# Patient Record
Sex: FEMALE | Race: BLACK OR AFRICAN AMERICAN | Employment: FULL TIME | URBAN - METROPOLITAN AREA
[De-identification: names, ages, dates, MRNs, and addresses within clinical notes are randomized per-mention and may not be internally consistent; named-entity substitution may affect disease eponyms.]

---

## 2017-01-01 PROBLEM — Z22.330 GBS CARRIER: Status: ACTIVE | Noted: 2017-01-01

## 2017-01-03 ENCOUNTER — ROUTINE PRENATAL (OUTPATIENT)
Dept: OBGYN CLINIC | Age: 37
End: 2017-01-03

## 2017-01-03 VITALS
SYSTOLIC BLOOD PRESSURE: 140 MMHG | BODY MASS INDEX: 35.88 KG/M2 | HEIGHT: 62 IN | DIASTOLIC BLOOD PRESSURE: 89 MMHG | WEIGHT: 195 LBS | RESPIRATION RATE: 18 BRPM | HEART RATE: 98 BPM

## 2017-01-03 DIAGNOSIS — Z3A.34 34 WEEKS GESTATION OF PREGNANCY: ICD-10-CM

## 2017-01-03 DIAGNOSIS — O09.93 HIGH-RISK PREGNANCY, THIRD TRIMESTER: Primary | ICD-10-CM

## 2017-01-03 NOTE — PROGRESS NOTES
Patient was seen on L&D  complaining of contractions. She had no cervical change, but was given 2 doses of betamethasone and is also receiving weekly Alfred due to a history of a previous  delivery. She states that the contractions have slowed and are milder now, but are still coming about once an hour. She also complains of leaking a small amount of fluid, but denies vaginal bleeding. SSE: no pooling, Nitrazine negative, fern negative  Cervical exam unchanged from previous. Patient reassured, PTL precautions given, to return  for scheduled Alfred. Plan of care discussed. Patient expressed understanding.

## 2017-01-03 NOTE — MR AVS SNAPSHOT
Visit Information Date & Time Provider Department Dept. Phone Encounter #  
 1/3/2017  8:00 AM Zacarias Yates, Neva Carlton [phone] [id number] Upcoming Health Maintenance Date Due  
 PAP AKA CERVICAL CYTOLOGY 8/12/2019 Allergies as of 1/3/2017  Review Complete On: 1/3/2017 By: Zacarias Yates DO Severity Noted Reaction Type Reactions Iodine  07/07/2016    Hives, Nausea and Vomiting MRI DYE Current Immunizations  Never Reviewed Name Date Influenza Vaccine 10/6/2016 Not reviewed this visit You Were Diagnosed With   
  
 Codes Comments High-risk pregnancy, third trimester    -  Primary ICD-10-CM: O09.93 
ICD-9-CM: V23.9 34 weeks gestation of pregnancy     ICD-10-CM: Z3A.34 
ICD-9-CM: V22.2 Vitals BP Pulse Resp Height(growth percentile) Weight(growth percentile) LMP  
 140/89 98 18 5' 2\" (1.575 m) 195 lb (88.5 kg) 05/10/2016 BMI OB Status Smoking Status 35.67 kg/m2 Pregnant Former Smoker BMI and BSA Data Body Mass Index Body Surface Area  
 35.67 kg/m 2 1.97 m 2 Preferred Pharmacy Pharmacy Name Phone [address] Your Updated Medication List  
  
   
This list is accurate as of: 1/3/17  9:08 AM.  Always use your most recent med list.  
  
  
  
  
 labetalol 100 mg tablet Commonly known as:  Abril Valdez Take 1 Tab by mouth two (2) times a day. prenatal vit-iron fumarate-fa 28 mg iron- 800 mcg Tab Commonly known as:  PRENATAL PLUS with IRON Take 1 Tab by mouth daily. Indications: PREGNANCY Introducing [facility] & HEALTH SERVICES! Dear Alfonso Oliva: Thank you for requesting a [employer] account. Our records indicate that you already have an active [employer] account. You can access your account anytime at [email or web address] Did you know that you can access your hospital and ER discharge instructions at any time in [employer]? You can also review all of your test results from your hospital stay or ER visit. Additional Information If you have questions, please visit the Frequently Asked Questions section of the [employer] website at [email or web address] Remember, [employer] is NOT to be used for urgent needs. For medical emergencies, dial 911. Now available from your iPhone and Android! Please provide this summary of care documentation to your next provider. Your primary care clinician is listed as NONE. If you have any questions after today's visit, please call [phone].

## 2017-01-05 ENCOUNTER — HOSPITAL ENCOUNTER (EMERGENCY)
Age: 37
Discharge: HOME | End: 2017-01-05
Attending: OBSTETRICS & GYNECOLOGY | Admitting: OBSTETRICS & GYNECOLOGY
Payer: MEDICAID

## 2017-01-05 ENCOUNTER — ROUTINE PRENATAL (OUTPATIENT)
Dept: OBGYN CLINIC | Age: 37
End: 2017-01-05

## 2017-01-05 VITALS — BODY MASS INDEX: 35.88 KG/M2 | HEIGHT: 62 IN | WEIGHT: 195 LBS | RESPIRATION RATE: 18 BRPM | TEMPERATURE: 99 F

## 2017-01-05 DIAGNOSIS — R31.21 ASYMPTOMATIC MICROSCOPIC HEMATURIA: ICD-10-CM

## 2017-01-05 DIAGNOSIS — Z36.89 NON-STRESS TEST REACTIVE: ICD-10-CM

## 2017-01-05 DIAGNOSIS — O09.893 HX OF PRETERM DELIVERY, CURRENTLY PREGNANT, THIRD TRIMESTER: ICD-10-CM

## 2017-01-05 DIAGNOSIS — I10 CHRONIC HYPERTENSION: ICD-10-CM

## 2017-01-05 DIAGNOSIS — E86.0 DEHYDRATION: ICD-10-CM

## 2017-01-05 DIAGNOSIS — O09.93 HIGH-RISK PREGNANCY, THIRD TRIMESTER: ICD-10-CM

## 2017-01-05 DIAGNOSIS — Z3A.34 34 WEEKS GESTATION OF PREGNANCY: ICD-10-CM

## 2017-01-05 DIAGNOSIS — O09.893 HX OF PRETERM DELIVERY, CURRENTLY PREGNANT, THIRD TRIMESTER: Primary | ICD-10-CM

## 2017-01-05 DIAGNOSIS — O34.33 PREMATURE CERVICAL DILATION IN THIRD TRIMESTER: ICD-10-CM

## 2017-01-05 LAB
APPEARANCE UR: CLEAR
BILIRUB UR QL: NEGATIVE
COLOR UR: YELLOW
GLUCOSE UR QL STRIP.AUTO: NEGATIVE MG/DL
KETONES UR-MCNC: NEGATIVE MG/DL
LEUKOCYTE ESTERASE UR QL STRIP: NEGATIVE
NITRITE UR QL: NEGATIVE
PH UR: 7 [PH] (ref 5–8)
PROT UR QL: ABNORMAL MG/DL
RBC # UR STRIP: ABNORMAL /UL
SP GR UR: 1.02 (ref 1–1.03)
UROBILINOGEN UR QL: 0.2 EU/DL (ref 0.2–1)

## 2017-01-05 PROCEDURE — 99285 EMERGENCY DEPT VISIT HI MDM: CPT

## 2017-01-05 PROCEDURE — 81003 URINALYSIS AUTO W/O SCOPE: CPT

## 2017-01-05 PROCEDURE — 59020 FETAL CONTRACT STRESS TEST: CPT

## 2017-01-05 PROCEDURE — 96360 HYDRATION IV INFUSION INIT: CPT

## 2017-01-05 PROCEDURE — 74011250636 HC RX REV CODE- 250/636: Performed by: OBSTETRICS & GYNECOLOGY

## 2017-01-05 PROCEDURE — 77030020255 HC SOL INJ LR 1000ML BG

## 2017-01-05 PROCEDURE — 87086 URINE CULTURE/COLONY COUNT: CPT | Performed by: OBSTETRICS & GYNECOLOGY

## 2017-01-05 PROCEDURE — 96361 HYDRATE IV INFUSION ADD-ON: CPT

## 2017-01-05 RX ORDER — NITROFURANTOIN 25; 75 MG/1; MG/1
100 CAPSULE ORAL 2 TIMES DAILY
Qty: 10 CAP | Refills: 0 | Status: SHIPPED | OUTPATIENT
Start: 2017-01-05 | End: 2017-01-10

## 2017-01-05 RX ORDER — HYDROXYPROGESTERONE CAPROATE 250 MG/ML
250 INJECTION INTRAMUSCULAR ONCE
Qty: 250 MG | Refills: 0 | Status: SHIPPED | COMMUNITY
Start: 2017-01-05 | End: 2017-01-05

## 2017-01-05 RX ADMIN — SODIUM CHLORIDE, SODIUM LACTATE, POTASSIUM CHLORIDE, AND CALCIUM CHLORIDE 1000 ML: 600; 310; 30; 20 INJECTION, SOLUTION INTRAVENOUS at 11:15

## 2017-01-05 NOTE — IP AVS SNAPSHOT
Current Discharge Medication List  
  
Take these medications at their scheduled times Dose & Instructions Dispensing Information Comments Morning Noon Evening Bedtime  
 labetalol 100 mg tablet Commonly known as:  Lorayne Arlee Your next dose is: Today, Tomorrow Other:  ____________ Dose:  100 mg Take 1 Tab by mouth two (2) times a day. Quantity:  60 Tab Refills:  1  
     
   
   
   
  
 nitrofurantoin (macrocrystal-monohydrate) 100 mg capsule Commonly known as:  MACROBID Your next dose is: Today, Tomorrow Other:  ____________ Dose:  100 mg Take 1 Cap by mouth two (2) times a day for 5 days. Quantity:  10 Cap Refills:  0  
     
   
   
   
  
 prenatal vit-iron fumarate-fa 28 mg iron- 800 mcg Tab Commonly known as:  PRENATAL PLUS with IRON Your next dose is: Today, Tomorrow Other:  ____________ Dose:  1 Tab Take 1 Tab by mouth daily. Indications: PREGNANCY Quantity:  30 Tab Refills:  11 Substitution allowed Take these medications as directed Dose & Instructions Dispensing Information Comments Morning Noon Evening Bedtime VELMA IM Your next dose is: Today, Tomorrow Other:  ____________  
   
   
 by IntraMUSCular route. Refills:  0 Where to Get Your Medications These medications were sent to [address] [place] [id number] Phone:  [phone]  
  nitrofurantoin (macrocrystal-monohydrate) 100 mg capsule

## 2017-01-05 NOTE — ROUTINE PROCESS
Pt up to [facility]B c/o contractions since last night. Oreinted to room,changed into gown, EFM and toco placed and explained. No c/o vaginal bleeding, but does c/o clear vaginal discharge this morning.

## 2017-01-05 NOTE — IP AVS SNAPSHOT
[address] Patient: Evie Patten MRN: [id number] RIF:[birth year] You are allergic to the following Allergen Reactions Iodine Hives Nausea and Vomiting MRI DYE Recent Documentation Height Weight BMI OB Status Smoking Status 1.575 m 88.5 kg 35.67 kg/m2 Pregnant Former Smoker Emergency Contacts Name Discharge Info Relation Home Work Mobile Josafat Peters  Spouse [3] [phone] About your hospitalization You were admitted on:  N/A You last received care in the:  [address] [place] [address] You were discharged on:  2017 Unit phone number:  [phone] Why you were hospitalized Your primary diagnosis was:  Not on File Your diagnoses also included:  High-Risk Pregnancy, Premature Cervical Dilation In Third Trimester, Non-Stress Test Reactive, Hx Of  Delivery, Currently Pregnant, Chronic Hypertension, 34 Weeks Gestation Of Pregnancy, Dehydration, Asymptomatic Microscopic Hematuria Providers Seen During Your Hospitalizations Provider Role Specialty Primary office phone Hayden Rothman DO Attending Provider Obstetrics & Gynecology [phone] Your Primary Care Physician (PCP) Primary Care Physician Office Phone Office Fax NONE ** None ** ** None ** Follow-up Information Follow up With Details Comments Contact Info Jaswant Mina DO  To office for Jing dose today. [facility] week [facility] [address] [facility] [address] [phone] Your Appointments Tuesday January 10, 2017 11:30 AM EST  
OB VISIT with Hayden Rothman DO  
[address] [place] [address] ([facility] 83 [id number] [phone] Current Discharge Medication List  
  
START taking these medications Dose & Instructions Dispensing Information Comments Morning Noon Evening Bedtime  
 nitrofurantoin (macrocrystal-monohydrate) 100 mg capsule Commonly known as:  MACROBID Your next dose is: Today, Tomorrow Other:  _________ Dose:  100 mg Take 1 Cap by mouth two (2) times a day for 5 days. Quantity:  10 Cap Refills:  0 CONTINUE these medications which have NOT CHANGED Dose & Instructions Dispensing Information Comments Morning Noon Evening Bedtime  
 labetalol 100 mg tablet Commonly known as:  Radha Priestly Your next dose is: Today, Tomorrow Other:  _________ Dose:  100 mg Take 1 Tab by mouth two (2) times a day. Quantity:  60 Tab Refills:  1 VELMA IM Your next dose is: Today, Tomorrow Other:  _________  
   
   
 by IntraMUSCular route. Refills:  0  
     
   
   
   
  
 prenatal vit-iron fumarate-fa 28 mg iron- 800 mcg Tab Commonly known as:  PRENATAL PLUS with IRON Your next dose is: Today, Tomorrow Other:  _________ Dose:  1 Tab Take 1 Tab by mouth daily. Indications: PREGNANCY Quantity:  30 Tab Refills:  11 Substitution allowed Where to Get Your Medications These medications were sent to [address] [place] [id number] Phone:  [phone]  
  nitrofurantoin (macrocrystal-monohydrate) 100 mg capsule Discharge Instructions None Discharge Instructions Attachments/References PREGNANCY: WEEKS 34 TO 36 (ENGLISH) ORAL REHYDRATION (ENGLISH) Discharge Orders None Introducing [facility] & HE[facility] SERVICES! Dear Cynthia Hogue: Thank you for requesting a [employer] account. Our records indicate that you already have an active [employer] account.   You can access your account anytime at [email or web address]. [email or web address] Did you know that you can access your hospital and ER discharge instructions at any time in [employer]? You can also review all of your test results from your hospital stay or ER visit. Additional Information If you have questions, please visit the Frequently Asked Questions section of the [employer] website at [email or web address] [employer]/[email or web address]/. Remember, [employer] is NOT to be used for urgent needs. For medical emergencies, dial 911. Now available from your iPhone and Android! General Information Please provide this summary of care documentation to your next provider. Patient Signature:  ____________________________________________________________ Date:  ____________________________________________________________  
  
Mavis Argueta Provider Signature:  ____________________________________________________________ Date:  ____________________________________________________________ More Information Weeks 34 to 36 of Your Pregnancy: Care Instructions Your Care Instructions By now, your baby and your belly have grown quite large. It is almost time to give birth. A full-term pregnancy can deliver between 37 and 42 weeks. Your baby's lungs are almost ready to breathe air. The bones in your baby's head are now firm enough to protect it, but soft enough to move down through the birth canal. 
You may feel excited, happy, anxious, or scared. You may wonder how you will know if you are in labor or what to expect during labor. Try to be flexible in your expectations of the birth. Because each birth is different, there is no way to know exactly what childbirth will be like for you. This care sheet will help you know what to expect and how to prepare. This may make your childbirth easier.  
If you haven't already had the Tdap shot during this pregnancy, talk to your doctor about getting it. It will help protect your  against pertussis infection. In the 36th week, most women have a test for group B streptococcus (GBS). GBS is a common bacteria that can live in the vagina and rectum. It can make your baby sick after birth. If you test positive, you will get antibiotics during labor. The medicine will keep your baby from getting the bacteria. Follow-up care is a key part of your treatment and safety. Be sure to make and go to all appointments, and call your doctor if you are having problems. It's also a good idea to know your test results and keep a list of the medicines you take. How can you care for yourself at home? Learn about pain relief choices · Pain is different for every woman. Talk with your doctor about your feelings about pain. · You can choose from several types of pain relief. These include medicine or breathing techniques, as well as comfort measures. You can use more than one option. · If you choose to have pain medicine during labor, talk to your doctor about your options. Some medicines lower anxiety and help with some of the pain. Others make your lower body numb so that you won't feel pain. · Be sure to tell your doctor about your pain medicine choice before you start labor or very early in your labor. You may be able to change your mind as labor progresses. · Rarely, a woman is put to sleep by medicine given through a mask or an IV. Labor and delivery · The first stage of labor has three parts: early, active, and transition. ¨ Most women have early labor at home. You can stay busy or rest, eat light snacks, drink clear fluids, and start counting contractions. ¨ When talking during a contraction gets hard, you may be moving to active labor. During active labor, you should head for the hospital if you are not there already.  
¨ You are in active labor when contractions come every 3 to 4 minutes and last about 60 seconds. Your cervix is opening more rapidly. ¨ If your water breaks, contractions will come faster and stronger. ¨ During transition, your cervix is stretching, and contractions are coming more rapidly. ¨ You may want to push, but your cervix might not be ready. Your doctor will tell you when to push. · The second stage starts when your cervix is completely opened and you are ready to push. ¨ Contractions are very strong to push the baby down the birth canal. 
¨ You will feel the urge to push. You may feel like you need to have a bowel movement. ¨ You may be coached to push with contractions. These contractions will be very strong, but you will not have them as often. You can get a little rest between contractions. ¨ You may be emotional and irritable. You may not be aware of what is going on around you. ¨ One last push, and your baby is born. · The third stage is when a few more contractions push out the placenta. This may take 30 minutes or less. · The fourth stage is the welcome recovery. You may feel overwhelmed with emotions and exhausted but alert. This is a good time to start breastfeeding. Where can you learn more? Go to http://pancho-humberto.info/. Enter G858 in the search box to learn more about \"Weeks 34 to 36 of Your Pregnancy: Care Instructions. \" Current as of: May 30, 2016 Content Version: 11.1 © 6467-4767 Healthwise, Incorporated. Care instructions adapted under license by [employer] (which disclaims liability or warranty for this information). If you have questions about a medical condition or this instruction, always ask your healthcare professional. [place] [address] any warranty or liability for your use of this information. Oral Rehydration: Care Instructions Your Care Instructions Dehydration occurs when your body loses too much water.  This can happen if you do not drink enough fluids or lose a lot of fluid due to diarrhea, vomiting, or sweating. Being dehydrated can cause health problems and can even be life-threatening. To replace lost fluids, you need to drink liquid that contains special chemicals called electrolytes. Electrolytes keep your body working well. Plain water does not have electrolytes. You also need to rest to prevent more fluid loss. Replacing water and electrolytes (oral rehydration) completely takes about 36 hours. But you should feel better within a few hours. Follow-up care is a key part of your treatment and safety. Be sure to make and go to all appointments, and call your doctor if you are having problems. It's also a good idea to know your test results and keep a list of the medicines you take. How can you care for yourself at home? · Take frequent sips of a drink such as Gatorade, Powerade, or other rehydration drinks that your doctor suggests. These replace both fluid and important chemicals (electrolytes) you need for balance in your blood. · Drink 2 quarts of cool liquid over 2 to 4 hours. You should have at least 10 glasses of liquid a day to replace lost fluid. If you have kidney, heart, or liver disease and have to limit fluids, talk with your doctor before you increase the amount of fluids you drink. · Make your own drink. Measure everything carefully. The drink may not work well or may even be harmful if the amounts are off. ¨ 1 quart water ¨ ½ teaspoon salt ¨ 6 teaspoons sugar · Do not drink liquid with caffeine, such as coffee and efrain. · Do not drink any alcohol. It can make you dehydrated. · Drink plenty of fluids, enough so that your urine is light yellow or clear like water. If you have kidney, heart, or liver disease and have to limit fluids, talk with your doctor before you increase the amount of fluids you drink. When should you call for help? Call 911 anytime you think you may need emergency care. For example, call if: 
· You have signs of severe dehydration, such as: 
¨ You are confused or unable to stay awake. ¨ You passed out (lost consciousness). Call your doctor now or seek immediate medical care if: 
· You still have signs of dehydration. You have sunken eyes and a dry mouth, and you pass only a little dark urine. · You are dizzy or lightheaded, or you feel like you may faint. · You are not able to keep down fluids. Watch closely for changes in your health, and be sure to contact your doctor if: 
· You do not get better as expected. Where can you learn more? Go to http://pancho-humberto.info/. Enter I040 in the search box to learn more about \"Oral Rehydration: Care Instructions. \" Current as of: May 27, 2016 Content Version: 11.1 © 2689-7962 [employer]. Care instructions adapted under license by [employer] (which disclaims liability or warranty for this information). If you have questions about a medical condition or this instruction, always ask your healthcare professional. [address] any warranty or liability for your use of this information.

## 2017-01-05 NOTE — PROGRESS NOTES
D/C instructions given per Dr Vazquez Record for PO hydration,  labor precautions, to follow up in MD's office today for [facility]. Pt also instructed to pic up antibiotic prescription for UTI, Urine sent to lab for C&S per Dr Vazquez Record. Pt verbalized understanding of instructions and D/C home ambulating with family at her side.

## 2017-01-05 NOTE — PROGRESS NOTES
History & Physical    Name: Jose Shay MRN: [id number]  SSN: [id number]    YOB: 1980  Age: 31 y.o. Sex: female      Estimated Date of Delivery: 17  OB History      Para Term  AB TAB SAB Ectopic Multiple Living    5 3 2 1 1  0 1  2          Ms. Vuong Sequeira presents with pregnancy at 34w2d for cramping q 15 mins, no LOF/VB. Normal fetal movement. Her prenatal course was complicated by   Patient Active Problem List   Diagnosis Code    Chronic hypertension I10    Adult celiac disease K90.0    History of ectopic pregnancy Z87.59    Rh negative status during pregnancy in second trimester, antepartum O09.892    High-risk pregnancy O09.90    Hx of  delivery, currently pregnant O09.219    Choroid plexus cyst of fetus O35. 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 Please see prenatal records for details. Past Medical History   Diagnosis Date    Abnormal Papanicolaou smear of cervix     Adult celiac disease 2016    GBS carrier 2017    H/O:  10/6/2016    History of ectopic pregnancy 2016    History of  delivery, currently pregnant in first trimester 2016    Hypertension     Rh negative status during pregnancy in second trimester, antepartum 2016     Past Surgical History   Procedure Laterality Date    Hx  section       TIMES 2    Hx salpingo-oophorectomy Left 2015     for ruptured ectopic; laparoscopic    Pr  delivery only       Family hx:  noncontributory  Social History     Occupational History    Not on file.      Social History Main Topics    Smoking status: Former Smoker    Smokeless tobacco: Not on file    Alcohol use No    Drug use: No    Sexual activity: Yes     Partners: Male     Family History   Problem Relation Age of Onset    Hypertension Mother     Diabetes Mother     Hypertension Father        Allergies   Allergen Reactions    Iodine Hives and Nausea and Vomiting     MRI DYE      Prior to Admission medications    Medication Sig Start Date End Date Taking? Authorizing Provider   HYDROXYPROGESTERONE CAPROATE (VELMA IM) by IntraMUSCular route. Yes Historical Provider   labetalol (NORMODYNE) 100 mg tablet Take 1 Tab by mouth two (2) times a day. 16  Yes Wilma Yoder DO   prenatal vit-iron fumarate-fa (PRENATAL PLUS WITH IRON) 28 mg iron- 800 mcg tab Take 1 Tab by mouth daily. Indications: PREGNANCY 10/6/16  Yes Wilma Yoder DO        Review of Systems: Pertinent items are noted in HPI. Objective:     Vitals:  Vitals:    17 1117 17 1118   Resp: 18    Temp: 99 °F (37.2 °C)    Weight:  195 lb (88.5 kg)   Height:  5' 2\" (1.575 m)        Physical Exam:  Gen:  NAD. Lips dry. Chest:  Normal respiratory effort without use of accessory muscles. Abdomen:  Gravid, NT  Exam findings per RN:   cervix 1-2/50/-3. Membranes intact.  mod variability, accels present, no decels. [place]:  irritability  UA trace blood. Prenatal Labs:   No results found for: RUBELLAEXT, GRBSEXT, HBSAGEXT, HIVEXT, RPREXT, GONNOEXT, CHLAMEXT      Assessment/Plan:     Patient Active Problem List   Diagnosis Code    Chronic hypertension I10    High-risk pregnancy O09.90    Hx of  delivery, currently pregnant O09.219    Non-stress test reactive Z36    Premature cervical dilation in third trimester O34.33    34 weeks gestation of pregnancy Z3A.34    Dehydration E86.0    Asymptomatic microscopic hematuria R31.21     -DC to office for velma dose.  -treat for presumed UTI. Urine sent for culture. -increase water intake.  -continue pelvic rest.  Plan of care discussed. Patient expressed understanding and agreement.       Signed By:  Edilia Lucia DO     2017

## 2017-01-07 LAB
BACTERIA SPEC CULT: NORMAL
SERVICE CMNT-IMP: NORMAL

## 2017-01-10 ENCOUNTER — HOSPITAL ENCOUNTER (EMERGENCY)
Age: 37
Discharge: HOME | End: 2017-01-10
Attending: OBSTETRICS & GYNECOLOGY | Admitting: OBSTETRICS & GYNECOLOGY
Payer: MEDICAID

## 2017-01-10 ENCOUNTER — HOSPITAL ENCOUNTER (OUTPATIENT)
Dept: LAB | Age: 37
Discharge: HOME | End: 2017-01-10

## 2017-01-10 ENCOUNTER — ROUTINE PRENATAL (OUTPATIENT)
Dept: OBGYN CLINIC | Age: 37
End: 2017-01-10

## 2017-01-10 VITALS
SYSTOLIC BLOOD PRESSURE: 137 MMHG | HEIGHT: 62 IN | WEIGHT: 190 LBS | HEART RATE: 127 BPM | DIASTOLIC BLOOD PRESSURE: 101 MMHG | BODY MASS INDEX: 34.96 KG/M2

## 2017-01-10 VITALS
SYSTOLIC BLOOD PRESSURE: 138 MMHG | RESPIRATION RATE: 18 BRPM | HEART RATE: 102 BPM | DIASTOLIC BLOOD PRESSURE: 86 MMHG | TEMPERATURE: 99.1 F

## 2017-01-10 DIAGNOSIS — I10 CHRONIC HYPERTENSION: ICD-10-CM

## 2017-01-10 DIAGNOSIS — Z34.83 PRENATAL CARE, SUBSEQUENT PREGNANCY, THIRD TRIMESTER: Primary | ICD-10-CM

## 2017-01-10 DIAGNOSIS — Z34.83 PRENATAL CARE, SUBSEQUENT PREGNANCY, THIRD TRIMESTER: ICD-10-CM

## 2017-01-10 DIAGNOSIS — Z3A.35 35 WEEKS GESTATION OF PREGNANCY: ICD-10-CM

## 2017-01-10 DIAGNOSIS — O09.893 HX OF PRETERM DELIVERY, CURRENTLY PREGNANT, THIRD TRIMESTER: ICD-10-CM

## 2017-01-10 PROBLEM — E86.0 DEHYDRATION: Status: RESOLVED | Noted: 2017-01-05 | Resolved: 2017-01-10

## 2017-01-10 PROBLEM — Z3A.34 34 WEEKS GESTATION OF PREGNANCY: Status: RESOLVED | Noted: 2017-01-05 | Resolved: 2017-01-10

## 2017-01-10 LAB
BILIRUB UR QL STRIP: NEGATIVE
GLUCOSE UR-MCNC: NEGATIVE MG/DL
KETONES P FAST UR STRIP-MCNC: NORMAL MG/DL
PH UR STRIP: 7 [PH] (ref 4.6–8)
PROT UR QL STRIP: NORMAL MG/DL
SP GR UR STRIP: 1.02 (ref 1–1.03)
UA UROBILINOGEN AMB POC: NORMAL (ref 0.2–1)
URINALYSIS CLARITY POC: NORMAL
URINALYSIS COLOR POC: NORMAL
URINE BLOOD POC: NORMAL
URINE LEUKOCYTES POC: NORMAL
URINE NITRITES POC: NEGATIVE

## 2017-01-10 PROCEDURE — 99001 SPECIMEN HANDLING PT-LAB: CPT | Performed by: OBSTETRICS & GYNECOLOGY

## 2017-01-10 PROCEDURE — 59025 FETAL NON-STRESS TEST: CPT

## 2017-01-10 RX ORDER — HYDROXYPROGESTERONE CAPROATE 250 MG/ML
250 INJECTION INTRAMUSCULAR ONCE
Qty: 250 MG | Refills: 0 | Status: SHIPPED | COMMUNITY
Start: 2017-01-10 | End: 2017-01-10

## 2017-01-10 NOTE — MR AVS SNAPSHOT
Visit Information Date & Time Provider Department Dept. Phone Encounter #  
 1/10/2017 11:30 AM Manjit Louis, [facility] [phone] [id number] Follow-up Instructions Return in about 2 weeks (around 2017). Upcoming Health Maintenance Date Due  
 PAP AKA CERVICAL CYTOLOGY 2019 Allergies as of 1/10/2017  Review Complete On: 2017 By: Kylie Ferrera LPN Severity Noted Reaction Type Reactions Iodine  2016    Hives, Nausea and Vomiting MRI DYE Current Immunizations  Never Reviewed Name Date Influenza Vaccine 10/6/2016 Not reviewed this visit You Were Diagnosed With   
  
 Codes Comments Prenatal care, subsequent pregnancy, third trimester    -  Primary ICD-10-CM: Z34.83 ICD-9-CM: V22.1 Hx of  delivery, currently pregnant, third trimester     ICD-10-CM: O09.213 ICD-9-CM: V23.41 Chronic hypertension     ICD-10-CM: I10 
ICD-9-CM: 401.9 35 weeks gestation of pregnancy     ICD-10-CM: Z3A.35 
ICD-9-CM: V22.2 Vitals BP Pulse Height(growth percentile) Weight(growth percentile) LMP BMI  
 (!) 137/101 (!) 127 5' 2\" (1.575 m) 190 lb (86.2 kg) 05/10/2016 34.75 kg/m2 OB Status Smoking Status Pregnant Former Smoker BMI and BSA Data Body Mass Index Body Surface Area 34.75 kg/m 2 1.94 m 2 Preferred Pharmacy Pharmacy Name Phone [address] Your Updated Medication List  
  
   
This list is accurate as of: 1/10/17 11:54 AM.  Always use your most recent med list.  
  
  
  
  
 labetalol 100 mg tablet Commonly known as:  Antonieta Bianchi Take 1 Tab by mouth two (2) times a day. * VELMA IM  
by IntraMUSCular route. * VELMA 250 mg/mL Oil Generic drug:  HYDROXYprogesterone cap(ppres) 250 mg by IntraMUSCular route once for 1 dose. prenatal vit-iron fumarate-fa 28 mg iron- 800 mcg Tab Commonly known as:  PRENATAL PLUS with IRON Take 1 Tab by mouth daily. Indications: PREGNANCY * Notice: This list has 2 medication(s) that are the same as other medications prescribed for you. Read the directions carefully, and ask your doctor or other care provider to review them with you. We Performed the Following AMB POC URINALYSIS DIP STICK MANUAL W/O MICRO [38700 CPT(R)] TN THER/PROPH/DIAG INJECTION, SUBCUT/IM [id number] CPT(R)] Follow-up Instructions Return in about 2 weeks (around 2017). To-Do List   
 01/10/2017 Lab:  CHLAMYDIA/NEISSERIA/TRICHOMONAS AMP   
  
 01/10/2017 Lab:  METABOLIC PANEL, COMPREHENSIVE   
  
 2017 Lab:  CBC WITH AUTOMATED DIFF   
  
 2017 Lab:  URIC ACID Patient Instructions Weeks 34 to 36 of Your Pregnancy: Care Instructions Your Care Instructions By now, your baby and your belly have grown quite large. It is almost time to give birth. A full-term pregnancy can deliver between 37 and 42 weeks. Your baby's lungs are almost ready to breathe air. The bones in your baby's head are now firm enough to protect it, but soft enough to move down through the birth canal. 
You may feel excited, happy, anxious, or scared. You may wonder how you will know if you are in labor or what to expect during labor. Try to be flexible in your expectations of the birth. Because each birth is different, there is no way to know exactly what childbirth will be like for you. This care sheet will help you know what to expect and how to prepare. This may make your childbirth easier. If you haven't already had the Tdap shot during this pregnancy, talk to your doctor about getting it. It will help protect your  against pertussis infection. In the 36th week, most women have a test for group B streptococcus (GBS). GBS is a common bacteria that can live in the vagina and rectum. It can make your baby sick after birth. If you test positive, you will get antibiotics during labor. The medicine will keep your baby from getting the bacteria. Follow-up care is a key part of your treatment and safety. Be sure to make and go to all appointments, and call your doctor if you are having problems. It's also a good idea to know your test results and keep a list of the medicines you take. How can you care for yourself at home? Learn about pain relief choices · Pain is different for every woman. Talk with your doctor about your feelings about pain. · You can choose from several types of pain relief. These include medicine or breathing techniques, as well as comfort measures. You can use more than one option. · If you choose to have pain medicine during labor, talk to your doctor about your options. Some medicines lower anxiety and help with some of the pain. Others make your lower body numb so that you won't feel pain. · Be sure to tell your doctor about your pain medicine choice before you start labor or very early in your labor. You may be able to change your mind as labor progresses. · Rarely, a woman is put to sleep by medicine given through a mask or an IV. Labor and delivery · The first stage of labor has three parts: early, active, and transition. ¨ Most women have early labor at home. You can stay busy or rest, eat light snacks, drink clear fluids, and start counting contractions. ¨ When talking during a contraction gets hard, you may be moving to active labor. During active labor, you should head for the hospital if you are not there already. ¨ You are in active labor when contractions come every 3 to 4 minutes and last about 60 seconds. Your cervix is opening more rapidly. ¨ If your water breaks, contractions will come faster and stronger.  
¨ During transition, your cervix is stretching, and contractions are coming more rapidly. ¨ You may want to push, but your cervix might not be ready. Your doctor will tell you when to push. · The second stage starts when your cervix is completely opened and you are ready to push. ¨ Contractions are very strong to push the baby down the birth canal. 
¨ You will feel the urge to push. You may feel like you need to have a bowel movement. ¨ You may be coached to push with contractions. These contractions will be very strong, but you will not have them as often. You can get a little rest between contractions. ¨ You may be emotional and irritable. You may not be aware of what is going on around you. ¨ One last push, and your baby is born. · The third stage is when a few more contractions push out the placenta. This may take 30 minutes or less. · The fourth stage is the welcome recovery. You may feel overwhelmed with emotions and exhausted but alert. This is a good time to start breastfeeding. Where can you learn more? Go to http://pancho-humberto.info/. Enter V260 in the search box to learn more about \"Weeks 34 to 36 of Your Pregnancy: Care Instructions. \" Current as of: May 30, 2016 Content Version: 11.1 © 6480-0932 [employer]. Care instructions adapted under license by [employer] (which disclaims liability or warranty for this information). If you have questions about a medical condition or this instruction, always ask your healthcare professional. [place] [address] any warranty or liability for your use of this information. Introducing [facility]! Dear Gareth Garcia: Thank you for requesting a [facility] account. Our records indicate that you already have an active [facility] account. You can access your account anytime at [email or web address] Did you know that you can access your hospital and ER discharge instructions at any time in [facility]?   You can also review all of your test results from your hospital stay or ER visit. Additional Information If you have questions, please visit the Frequently Asked Questions section of the [employer] website at [email or web address] Remember, [employer] is NOT to be used for urgent needs. For medical emergencies, dial 911. Now available from your iPhone and Android! Please provide this summary of care documentation to your next provider. Your primary care clinician is listed as NONE. If you have any questions after today's visit, please call [phone].

## 2017-01-10 NOTE — IP AVS SNAPSHOT
Johnny Pro 
 
 
 [address] [place] [address] Patient: Simone Vale MRN: [id number] OFR:0/28/8856 You are allergic to the following Allergen Reactions Iodine Hives Nausea and Vomiting MRI DYE Recent Documentation OB Status Smoking Status Pregnant Former Smoker Emergency Contacts Name Discharge Info Relation Home Work Mobile Josafat Peters  Spouse [3] [phone] About your hospitalization You were admitted on:  N/A You last received care in the:  [address] [place] [address] You were discharged on:  January 10, 2017 Unit phone number:  [phone] Why you were hospitalized Your primary diagnosis was:  Not on File Your diagnoses also included:  High-Risk Pregnancy, Chronic Hypertension, 35 Weeks Gestation Of Pregnancy, Non-Stress Test Reactive On Fetal Surveillance Providers Seen During Your Hospitalizations Provider Role Specialty Primary office phone Samuel Solis DO Attending Provider Obstetrics & Gynecology [phone] Your Primary Care Physician (PCP) Primary Care Physician Office Phone Office Fax NONE ** None ** ** None ** Follow-up Information Follow up With Details Comments Contact Info None   None (395) Patient stated that they have no PCP Your Appointments Tuesday January 17, 2017 11:30 AM EST  
OB VISIT with Angel Justice DO  
[facility] ([address]) [address] [place] [id number] [phone] Current Discharge Medication List  
  
CONTINUE these medications which have NOT CHANGED Dose & Instructions Dispensing Information Comments Morning Noon Evening Bedtime  
 labetalol 100 mg tablet Commonly known as:  Brie Flores Your next dose is: Today, Tomorrow Other:  _________  Dose:  100 mg  
 Take 1 Tab by mouth two (2) times a day. Quantity:  60 Tab Refills:  1  
     
   
   
   
  
 * VELMA IM Your next dose is: Today, Tomorrow Other:  _________  
   
   
 by IntraMUSCular route. Refills:  0  
     
   
   
   
  
 * VELMA 250 mg/mL Oil Generic drug:  HYDROXYprogesterone cap(ppres) Your next dose is: Today, Tomorrow Other:  _________ Dose:  250 mg  
250 mg by IntraMUSCular route once for 1 dose. Quantity:  250 mg Refills:  0  
     
   
   
   
  
 prenatal vit-iron fumarate-fa 28 mg iron- 800 mcg Tab Commonly known as:  PRENATAL PLUS with IRON Your next dose is: Today, Tomorrow Other:  _________ Dose:  1 Tab Take 1 Tab by mouth daily. Indications: PREGNANCY Quantity:  30 Tab Refills:  11 Substitution allowed * Notice: This list has 2 medication(s) that are the same as other medications prescribed for you. Read the directions carefully, and ask your doctor or other care provider to review them with you. Discharge Instructions Discharge instructions reviewed with pt verbally and pt given written copy of instructions. NOTIFY YOUR DOCTOR/PROVIDER IF: 
 
Signs and symptoms of labor-continuing tightening and relaxation of abdomen Fever 100.4 Bleeding or leaking of fluid from the vagina Persistent headache that does not go away with rest and tylenol Severe abdominal pain Fetal kick counts - 10 baby movements in 1 hour Hydration- eight 8 oz glasses of water every day Visual disturbances Nausea and vomiting for more than 12 hours. Questions asked and answered. Follow up  As scheduled 1/17/17 in office NST scheduled in Labor and Delivery 1/17/17 at 1200 noon---[phone] Discharge Instructions Attachments/References PREGNANCY: KICK COUNTS (ENGLISH) PREGNANCY: PRECAUTIONS (ENGLISH) PREGNANCY: WEEKS 34 TO 36 (ENGLISH) Discharge Orders None  
  
Introducing [facility]! Dear Margarito Helms: Thank you for requesting a [employer] account. Our records indicate that you already have an active [employer] account. You can access your account anytime at [email or web address] Did you know that you can access your hospital and ER discharge instructions at any time in [employer]? You can also review all of your test results from your hospital stay or ER visit. Additional Information If you have questions, please visit the Frequently Asked Questions section of the [employer] website at [email or web address] Remember, [employer] is NOT to be used for urgent needs. For medical emergencies, dial 911. Now available from your iPhone and Android! General Information Please provide this summary of care documentation to your next provider. Patient Signature:  ____________________________________________________________ Date:  ____________________________________________________________  
  
Minoo Lincoln Provider Signature:  ____________________________________________________________ Date:  ____________________________________________________________ More Information Counting Your Baby's Kicks: Care Instructions Your Care Instructions Counting your baby's kicks is one way your doctor can tell that your baby is healthy. Most womenespecially in a first pregnancyfeel their baby move for the first time between 16 and 22 weeks. The movement may feel like flutters rather than kicks. Your baby may move more at certain times of the day. When you are active, you may notice less kicking than when you are resting. At your prenatal visits, your doctor will ask whether the baby is active. In your last trimester, your doctor may ask you to count the number of times you feel your baby move. Follow-up care is a key part of your treatment and safety.  Be sure to make and go to all appointments, and call your doctor if you are having problems. It's also a good idea to know your test results and keep a list of the medicines you take. How do you count fetal kicks? · A common method of checking your baby's movement is to count the number of kicks or moves you feel in 1 hour. Ten movements (such as kicks, flutters, or rolls) in 1 hour are normal. Some doctors suggest that you count in the morning until you get to 10 movements. Then you can quit for that day and start again the next day. · Pick your baby's most active time of day to count. This may be any time from morning to evening. · If you do not feel 10 movements in an hour, your baby may be sleeping. Wait for the next hour and count again. When should you call for help? Call your doctor now or seek immediate medical care if: 
· You noticed that your baby has stopped moving or is moving much less than normal. 
Watch closely for changes in your health, and be sure to contact your doctor if you have any problems. Where can you learn more? Go to http://pancho-humberto.info/. Enter X878 in the search box to learn more about \"Counting Your Baby's Kicks: Care Instructions. \" Current as of: May 30, 2016 Content Version: 11.1 © 4358-8225 [employer]. Care instructions adapted under license by [employer] (which disclaims liability or warranty for this information). If you have questions about a medical condition or this instruction, always ask your healthcare professional. [place] [address] any warranty or liability for your use of this information. Pregnancy Precautions: Care Instructions Your Care Instructions There is no sure way to prevent labor before your due date ( labor) or to prevent most other pregnancy problems. But there are things you can do to increase your chances of a healthy pregnancy.  Go to your appointments, follow your doctor's advice, and take good care of yourself. Eat well, and exercise (if your doctor agrees). And make sure to drink plenty of water. Follow-up care is a key part of your treatment and safety. Be sure to make and go to all appointments, and call your doctor if you are having problems. It's also a good idea to know your test results and keep a list of the medicines you take. How can you care for yourself at home? · Make sure you go to your prenatal appointments. At each visit, your doctor will check your blood pressure. Your doctor will also check to see if you have protein in your urine. High blood pressure and protein in urine are signs of preeclampsia. This condition can be dangerous for you and your baby. · Drink plenty of fluids, enough so that your urine is light yellow or clear like water. Dehydration can cause contractions. If you have kidney, heart, or liver disease and have to limit fluids, talk with your doctor before you increase the amount of fluids you drink. · Tell your doctor right away if you notice any symptoms of an infection, such as: ¨ Burning when you urinate. ¨ A foul-smelling discharge from your vagina. ¨ Vaginal itching. ¨ Unexplained fever. ¨ Unusual pain or soreness in your uterus or lower belly. · Eat a balanced diet. Include plenty of foods that are high in calcium and iron. ¨ Foods high in calcium include milk, cheese, yogurt, almonds, and broccoli. ¨ Foods high in iron include red meat, shellfish, poultry, eggs, beans, raisins, whole-grain bread, and leafy green vegetables. · Do not smoke. If you need help quitting, talk to your doctor about stop-smoking programs and medicines. These can increase your chances of quitting for good. · Do not drink alcohol or use illegal drugs. · Follow your doctor's directions about activity. Your doctor will let you know how much, if any, exercise you can do. · Ask your doctor if you can have sex. If you are at risk for early labor, your doctor may ask you to not have sex. · Take care to prevent falls. During pregnancy, your joints are loose, and your balance is off. Sports such as bicycling, skiing, or in-line skating can increase your risk of falling. And don't ride horses or motorcycles, dive, water ski, scuba dive, or parachute jump while you are pregnant. · Avoid getting very hot. Do not use saunas or hot tubs. Avoid staying out in the sun in hot weather for long periods. Take acetaminophen (Tylenol) to lower a high fever. · Do not take any over-the-counter or herbal medicines or supplements without talking to your doctor or pharmacist first. 
When should you call for help? Call 911 anytime you think you may need emergency care. For example, call if: 
· You passed out (lost consciousness). · You have severe vaginal bleeding. · You have severe pain in your belly or pelvis. · You have had fluid gushing or leaking from your vagina and you know or think the umbilical cord is bulging into your vagina. If this happens, immediately get down on your knees so your rear end (buttocks) is higher than your head. This will decrease the pressure on the cord until help arrives. Call your doctor now or seek immediate medical care if: 
· You have signs of preeclampsia, such as: 
¨ Sudden swelling of your face, hands, or feet. ¨ New vision problems (such as dimness or blurring). ¨ A severe headache. · You have any vaginal bleeding. · You have belly pain or cramping. · You have a fever. · You have had regular contractions (with or without pain) for an hour. This means that you have 8 or more within 1 hour or 4 or more in 20 minutes after you change your position and drink fluids. · You have a sudden release of fluid from your vagina. · You have low back pain or pelvic pressure that does not go away. · You notice that your baby has stopped moving or is moving much less than normal. 
Watch closely for changes in your health, and be sure to contact your doctor if you have any problems. Where can you learn more? Go to http://pancho-humberto.info/. Enter [id number] in the search box to learn more about \"Pregnancy Precautions: Care Instructions. \" Current as of: May 30, 2016 Content Version: 11.1  [employer]. Care instructions adapted under license by [employer] (which disclaims liability or warranty for this information). If you have questions about a medical condition or this instruction, always ask your healthcare professional. [place] [address] any warranty or liability for your use of this information. Weeks 34 to 36 of Your Pregnancy: Care Instructions Your Care Instructions By now, your baby and your belly have grown quite large. It is almost time to give birth. A full-term pregnancy can deliver between 37 and 42 weeks. Your baby's lungs are almost ready to breathe air. The bones in your baby's head are now firm enough to protect it, but soft enough to move down through the birth canal. 
You may feel excited, happy, anxious, or scared. You may wonder how you will know if you are in labor or what to expect during labor. Try to be flexible in your expectations of the birth. Because each birth is different, there is no way to know exactly what childbirth will be like for you. This care sheet will help you know what to expect and how to prepare. This may make your childbirth easier. If you haven't already had the Tdap shot during this pregnancy, talk to your doctor about getting it. It will help protect your  against pertussis infection. In the 36th week, most women have a test for group B streptococcus (GBS). GBS is a common bacteria that can live in the vagina and rectum.  It can make your baby sick after birth. If you test positive, you will get antibiotics during labor. The medicine will keep your baby from getting the bacteria. Follow-up care is a key part of your treatment and safety. Be sure to make and go to all appointments, and call your doctor if you are having problems. It's also a good idea to know your test results and keep a list of the medicines you take. How can you care for yourself at home? Learn about pain relief choices · Pain is different for every woman. Talk with your doctor about your feelings about pain. · You can choose from several types of pain relief. These include medicine or breathing techniques, as well as comfort measures. You can use more than one option. · If you choose to have pain medicine during labor, talk to your doctor about your options. Some medicines lower anxiety and help with some of the pain. Others make your lower body numb so that you won't feel pain. · Be sure to tell your doctor about your pain medicine choice before you start labor or very early in your labor. You may be able to change your mind as labor progresses. · Rarely, a woman is put to sleep by medicine given through a mask or an IV. Labor and delivery · The first stage of labor has three parts: early, active, and transition. ¨ Most women have early labor at home. You can stay busy or rest, eat light snacks, drink clear fluids, and start counting contractions. ¨ When talking during a contraction gets hard, you may be moving to active labor. During active labor, you should head for the hospital if you are not there already. ¨ You are in active labor when contractions come every 3 to 4 minutes and last about 60 seconds. Your cervix is opening more rapidly. ¨ If your water breaks, contractions will come faster and stronger. ¨ During transition, your cervix is stretching, and contractions are coming more rapidly. ¨ You may want to push, but your cervix might not be ready. Your doctor will tell you when to push. · The second stage starts when your cervix is completely opened and you are ready to push. ¨ Contractions are very strong to push the baby down the birth canal. 
¨ You will feel the urge to push. You may feel like you need to have a bowel movement. ¨ You may be coached to push with contractions. These contractions will be very strong, but you will not have them as often. You can get a little rest between contractions. ¨ You may be emotional and irritable. You may not be aware of what is going on around you. ¨ One last push, and your baby is born. · The third stage is when a few more contractions push out the placenta. This may take 30 minutes or less. · The fourth stage is the welcome recovery. You may feel overwhelmed with emotions and exhausted but alert. This is a good time to start breastfeeding. Where can you learn more? Go to http://pancho-humberto.info/. Enter H687 in the search box to learn more about \"Weeks 34 to 36 of Your Pregnancy: Care Instructions. \" Current as of: May 30, 2016 Content Version: 11.1 © 9737-6906 [employer]. Care instructions adapted under license by [employer] (which disclaims liability or warranty for this information). If you have questions about a medical condition or this instruction, always ask your healthcare professional. [place] [address] any warranty or liability for your use of this information.

## 2017-01-10 NOTE — DISCHARGE INSTRUCTIONS
Discharge instructions reviewed with pt verbally and pt given written copy of instructions. NOTIFY YOUR DOCTOR/PROVIDER IF:    Signs and symptoms of labor-continuing tightening and relaxation of abdomen  Fever 100.4  Bleeding or leaking of fluid from the vagina  Persistent headache that does not go away with rest and tylenol  Severe abdominal pain    Fetal kick counts - 10 baby movements in 1 hour   Hydration- eight 8 oz glasses of water every day  Visual disturbances  Nausea and vomiting for more than 12 hours. Questions asked and answered.         Follow up  As scheduled 1/17/17 in office  NST scheduled in Labor and Delivery 1/17/17 at 1200 noon---[phone]

## 2017-01-10 NOTE — PATIENT INSTRUCTIONS

## 2017-01-10 NOTE — PROGRESS NOTES
[place] injection give today placed in the Right Gluteus  Lot# [id number] 03/01/2019 NDC# [id number] patient instructed to follow up next week for next injection

## 2017-01-10 NOTE — IP AVS SNAPSHOT
Summary of Care Report The Summary of Care report has been created to help improve care coordination. Users with access to [employer] or [address] (Web-based application) may access additional patient information including the Discharge Summary. If you are not currently a [address] user and need more information, please call the number listed below in the [place] [address] section and ask to be connected with Medical Records. Facility Information Name Address Phone [facility] [address] [facility] [address] [id number] [phone] Patient Information Patient Name Sex FREDDIE Siddiqui ([id number]) Female 1980 Discharge Information Admitting Provider Service Area Unit Geovanni Franks, DO / [address] Labor & Delivery / [phone] Discharge Provider Discharge Date/Time Discharge Disposition Destination (none) 1/10/2017 (Pending) AHR (none) Patient Language Language ENGLISH [13] Problem List as of 1/10/2017  Date Reviewed: 1/10/2017 Codes Priority Class Noted - Resolved Chronic hypertension ICD-10-CM: I10 
ICD-9-CM: 401.9   2016 - Present RESOLVED: History of  delivery, currently pregnant in first trimester ICD-10-CM: O09.211 ICD-9-CM: V23.41   2016 - 2016 Adult celiac disease ICD-10-CM: K90.0 ICD-9-CM: 579.0   2016 - Present History of ectopic pregnancy ICD-10-CM: Z87.59 
ICD-9-CM: V13.29   2016 - Present Rh negative status during pregnancy in second trimester, antepartum ICD-10-CM: O09.892 ICD-9-CM: V23.89   2016 - Present High-risk pregnancy ICD-10-CM: O09.90 ICD-9-CM: V23.9   2016 - Present Hx of  delivery, currently pregnant ICD-10-CM: O09.219 ICD-9-CM: V23.41   2016 - Present Choroid plexus cyst of fetus ICD-10-CM: O35. 1TR2 ICD-9-CM: 655.00   2016 - Present Overview Signed 2016 11:30 AM by Andrzej Wang DO  
  bilateral 
  
  
 H/O:  ICD-10-CM: U39.966 ICD-9-CM: V45.89   10/6/2016 - Present Overview Signed 10/6/2016  2:26 PM by Andrzej Wang DO  
  x2 RESOLVED: 31 weeks gestation of pregnancy ICD-10-CM: Z3A.31 
ICD-9-CM: V22.2   2016 - 1/10/2017 Non-stress test reactive on fetal surveillance ICD-10-CM: Z36 ICD-9-CM: V28.89   2016 - Present RESOLVED: 33 weeks gestation of pregnancy ICD-10-CM: Z3A.33 
ICD-9-CM: V22.2   2016 - 1/10/2017 Premature cervical dilation in third trimester ICD-10-CM: O34.33 
ICD-9-CM: 654.53   2016 - Present Overview Signed 2016  3:27 PM by Jess Gutierrez DO  
  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 
ICD-9-CM: V22.2   2017 - 1/10/2017 RESOLVED: Dehydration ICD-10-CM: E86.0 ICD-9-CM: 276.51   2017 - 1/10/2017 Asymptomatic microscopic hematuria ICD-10-CM: R31.21 
ICD-9-CM: 599.72   2017 - Present 35 weeks gestation of pregnancy ICD-10-CM: Z3A.35 
ICD-9-CM: V22.2   1/10/2017 - Present You are allergic to the following Allergen Reactions Iodine Hives Nausea and Vomiting MRI DYE Current Discharge Medication List  
  
CONTINUE these medications which have NOT CHANGED Dose & Instructions Dispensing Information Comments  
 labetalol 100 mg tablet Commonly known as:  Suella Cosier Dose:  100 mg Take 1 Tab by mouth two (2) times a day. Quantity:  60 Tab Refills:  1  
   
 * VELMA IM  
 by IntraMUSCular route. Refills:  0  
   
 * VELMA 250 mg/mL Oil Generic drug:  HYDROXYprogesterone cap(ppres)  Dose:  250 mg  
 250 mg by IntraMUSCular route once for 1 dose. Quantity:  250 mg Refills:  0  
   
 prenatal vit-iron fumarate-fa 28 mg iron- 800 mcg Tab Commonly known as:  PRENATAL PLUS with IRON Dose:  1 Tab Take 1 Tab by mouth daily. Indications: PREGNANCY Quantity:  30 Tab Refills:  11 Substitution allowed * Notice: This list has 2 medication(s) that are the same as other medications prescribed for you. Read the directions carefully, and ask your doctor or other care provider to review them with you. Current Immunizations Name Date Influenza Vaccine 10/6/2016 Follow-up Information Follow up With Details Comments Contact Info None   None (395) Patient stated that they have no PCP Discharge Instructions Discharge instructions reviewed with pt verbally and pt given written copy of instructions. NOTIFY YOUR DOCTOR/PROVIDER IF: 
 
Signs and symptoms of labor-continuing tightening and relaxation of abdomen Fever 100.4 Bleeding or leaking of fluid from the vagina Persistent headache that does not go away with rest and tylenol Severe abdominal pain Fetal kick counts - 10 baby movements in 1 hour Hydration- eight 8 oz glasses of water every day Visual disturbances Nausea and vomiting for more than 12 hours. Questions asked and answered. Follow up  As scheduled 1/17/17 in office NST scheduled in Labor and Delivery 1/17/17 at 1200 noon---[phone] Chart Review Routing History No Routing History on File

## 2017-01-10 NOTE — PROGRESS NOTES
35w0d. Patient doing well. Denies contractions, decreased fetal movement, vaginal bleeding, leak of fluid. GBS+. GC/CT today. Chronic HTN, elevated BP today. Denies headache, blurry vision/vision changes, RUQ pain. Physical exam:  2+DTRs, no clonus, 1+ edema. PIH precautions given. [address] labs today. Repeat 24 hr urine protein. Start weekly NSTS. H/o PTD, FFN+, s/p ANCS, cervix unchanged. Getting last Progesterone IM next week. RTO in 1 week. Treated for UTI, will get urine LIZZ today. For repeat CD. I have verbalized the plan of care with patient. The patient was given a full opportunity to ask questions, indicated that her questions had been answered and expressed understanding.

## 2017-01-10 NOTE — PROGRESS NOTES
Antepartum surveillance:   Indication:    Chronic hypertension I10    High-risk pregnancy O09.90    35 weeks gestation of pregnancy Z3A.35       Presenting symptoms and findings discussed with RN. Patient Vitals for the past 4 hrs:   Temp Pulse Resp BP   01/10/17 1230 99.1 °F (37.3 °C) (!) 102 18 138/86       Remote EFM reviewed. FHT:  150 moderate variability, accels present, no decels. A/P   Chronic hypertension I10    High-risk pregnancy O09.90    Non-stress test reactive on fetal surveillance Z36    35 weeks gestation of pregnancy Z3A.35     -DC home   -follow up as scheduled.

## 2017-01-10 NOTE — PROGRESS NOTES
1222 received ambulatory to room 3414 for NST due to chronic hypertension. Denies headache, dizziness, blurred vision. Denies regular CTX, vaginal leakage, bleeding admits to fetal movement. EFM applied. Ice water given, scheduled for NST Tuesday 1/17/17     Reviewed discharge instructions, verbalies  Understanding of self care. Denies questions, problems or c/o.  Discharged to home

## 2017-01-11 LAB
ALBUMIN SERPL-MCNC: 3.7 G/DL (ref 3.5–5.5)
ALBUMIN/GLOB SERPL: 1.3 {RATIO} (ref 1.1–2.5)
ALP SERPL-CCNC: 122 IU/L (ref 39–117)
ALT SERPL-CCNC: 12 IU/L (ref 0–32)
AST SERPL-CCNC: 17 IU/L (ref 0–40)
BASOPHILS # BLD AUTO: 0 X10E3/UL (ref 0–0.2)
BASOPHILS NFR BLD AUTO: 0 %
BILIRUB SERPL-MCNC: 0.4 MG/DL (ref 0–1.2)
BUN SERPL-MCNC: 6 MG/DL (ref 6–20)
BUN/CREAT SERPL: 12 (ref 8–20)
CALCIUM SERPL-MCNC: 9.4 MG/DL (ref 8.7–10.2)
CHLORIDE SERPL-SCNC: 102 MMOL/L (ref 96–106)
CO2 SERPL-SCNC: 20 MMOL/L (ref 18–29)
CREAT SERPL-MCNC: 0.51 MG/DL (ref 0.57–1)
EOSINOPHIL # BLD AUTO: 0.2 X10E3/UL (ref 0–0.4)
EOSINOPHIL NFR BLD AUTO: 2 %
ERYTHROCYTE [DISTWIDTH] IN BLOOD BY AUTOMATED COUNT: 13.9 % (ref 12.3–15.4)
GLOBULIN SER CALC-MCNC: 2.9 G/DL (ref 1.5–4.5)
GLUCOSE SERPL-MCNC: 121 MG/DL (ref 65–99)
HCT VFR BLD AUTO: 38.6 % (ref 34–46.6)
HGB BLD-MCNC: 12.8 G/DL (ref 11.1–15.9)
IMM GRANULOCYTES # BLD: 0 X10E3/UL (ref 0–0.1)
IMM GRANULOCYTES NFR BLD: 0 %
LYMPHOCYTES # BLD AUTO: 2.6 X10E3/UL (ref 0.7–3.1)
LYMPHOCYTES NFR BLD AUTO: 20 %
MCH RBC QN AUTO: 30.8 PG (ref 26.6–33)
MCHC RBC AUTO-ENTMCNC: 33.2 G/DL (ref 31.5–35.7)
MCV RBC AUTO: 93 FL (ref 79–97)
MONOCYTES # BLD AUTO: 0.8 X10E3/UL (ref 0.1–0.9)
MONOCYTES NFR BLD AUTO: 6 %
NEUTROPHILS # BLD AUTO: 9.3 X10E3/UL (ref 1.4–7)
NEUTROPHILS NFR BLD AUTO: 72 %
PLATELET # BLD AUTO: 224 X10E3/UL (ref 150–379)
POTASSIUM SERPL-SCNC: 4.1 MMOL/L (ref 3.5–5.2)
PROT SERPL-MCNC: 6.6 G/DL (ref 6–8.5)
RBC # BLD AUTO: 4.16 X10E6/UL (ref 3.77–5.28)
SODIUM SERPL-SCNC: 139 MMOL/L (ref 134–144)
URATE SERPL-MCNC: 4.2 MG/DL (ref 2.5–7.1)
WBC # BLD AUTO: 12.9 X10E3/UL (ref 3.4–10.8)

## 2017-01-12 ENCOUNTER — HOSPITAL ENCOUNTER (OUTPATIENT)
Dept: LAB | Age: 37
Discharge: HOME | End: 2017-01-12

## 2017-01-12 ENCOUNTER — OFFICE VISIT (OUTPATIENT)
Dept: OBGYN CLINIC | Age: 37
End: 2017-01-12

## 2017-01-12 DIAGNOSIS — O16.3 HYPERTENSION AFFECTING PREGNANCY, THIRD TRIMESTER: Primary | ICD-10-CM

## 2017-01-12 PROCEDURE — 99001 SPECIMEN HANDLING PT-LAB: CPT | Performed by: OBSTETRICS & GYNECOLOGY

## 2017-01-13 ENCOUNTER — TELEPHONE (OUTPATIENT)
Dept: OBGYN CLINIC | Age: 37
End: 2017-01-13

## 2017-01-13 LAB
PROT 24H UR-MRATE: 224 MG/24 HR (ref 30–150)
PROT UR-MCNC: 44.8 MG/DL

## 2017-01-13 NOTE — TELEPHONE ENCOUNTER
----- Message from Easton Alvarez DO sent at 1/13/2017  3:58 PM EST -----  24 hour urine protein 224 (WNL). JAD Myers to call patient to notify her.

## 2017-01-14 ENCOUNTER — HOSPITAL ENCOUNTER (EMERGENCY)
Age: 37
LOS: 1 days | Discharge: HOME | End: 2017-01-14
Attending: OBSTETRICS & GYNECOLOGY | Admitting: OBSTETRICS & GYNECOLOGY
Payer: MEDICAID

## 2017-01-14 VITALS
TEMPERATURE: 98.8 F | HEART RATE: 97 BPM | DIASTOLIC BLOOD PRESSURE: 95 MMHG | RESPIRATION RATE: 16 BRPM | SYSTOLIC BLOOD PRESSURE: 140 MMHG

## 2017-01-14 DIAGNOSIS — O09.93 HIGH-RISK PREGNANCY, THIRD TRIMESTER: ICD-10-CM

## 2017-01-14 DIAGNOSIS — I10 CHRONIC HYPERTENSION: ICD-10-CM

## 2017-01-14 DIAGNOSIS — O34.33 PREMATURE CERVICAL DILATION IN THIRD TRIMESTER: ICD-10-CM

## 2017-01-14 DIAGNOSIS — Z36.89 NON-STRESS TEST REACTIVE ON FETAL SURVEILLANCE: ICD-10-CM

## 2017-01-14 DIAGNOSIS — Z3A.35 35 WEEKS GESTATION OF PREGNANCY: ICD-10-CM

## 2017-01-14 DIAGNOSIS — O47.03 FALSE LABOR BEFORE 37 COMPLETED WEEKS OF GESTATION IN THIRD TRIMESTER: ICD-10-CM

## 2017-01-14 DIAGNOSIS — O09.893 HX OF PRETERM DELIVERY, CURRENTLY PREGNANT, THIRD TRIMESTER: ICD-10-CM

## 2017-01-14 LAB
ABO + RH BLD: NORMAL
ALBUMIN SERPL BCP-MCNC: 2.9 G/DL (ref 3.4–5)
ALBUMIN/GLOB SERPL: 0.7 {RATIO} (ref 0.8–1.7)
ALP SERPL-CCNC: 131 U/L (ref 45–117)
ALT SERPL-CCNC: 19 U/L (ref 13–56)
ANION GAP BLD CALC-SCNC: 10 MMOL/L (ref 3–18)
AST SERPL W P-5'-P-CCNC: 14 U/L (ref 15–37)
BASOPHILS # BLD AUTO: 0 K/UL (ref 0–0.06)
BASOPHILS # BLD: 0 % (ref 0–2)
BILIRUB SERPL-MCNC: 0.4 MG/DL (ref 0.2–1)
BLOOD BANK CMNT PATIENT-IMP: NORMAL
BLOOD GROUP ANTIBODIES SERPL: NORMAL
BLOOD GROUP ANTIBODIES SERPL: NORMAL
BUN SERPL-MCNC: 7 MG/DL (ref 7–18)
BUN/CREAT SERPL: 15 (ref 12–20)
CALCIUM SERPL-MCNC: 9 MG/DL (ref 8.5–10.1)
CHLORIDE SERPL-SCNC: 105 MMOL/L (ref 100–108)
CO2 SERPL-SCNC: 25 MMOL/L (ref 21–32)
CREAT SERPL-MCNC: 0.46 MG/DL (ref 0.6–1.3)
DIFFERENTIAL METHOD BLD: ABNORMAL
EOSINOPHIL # BLD: 0.2 K/UL (ref 0–0.4)
EOSINOPHIL NFR BLD: 2 % (ref 0–5)
ERYTHROCYTE [DISTWIDTH] IN BLOOD BY AUTOMATED COUNT: 13.9 % (ref 11.6–14.5)
GLOBULIN SER CALC-MCNC: 3.9 G/DL (ref 2–4)
GLUCOSE SERPL-MCNC: 90 MG/DL (ref 74–99)
HCT VFR BLD AUTO: 37.5 % (ref 35–45)
HGB BLD-MCNC: 12.4 G/DL (ref 12–16)
LYMPHOCYTES # BLD AUTO: 20 % (ref 21–52)
LYMPHOCYTES # BLD: 2.2 K/UL (ref 0.9–3.6)
MCH RBC QN AUTO: 30.9 PG (ref 24–34)
MCHC RBC AUTO-ENTMCNC: 33.1 G/DL (ref 31–37)
MCV RBC AUTO: 93.5 FL (ref 74–97)
MONOCYTES # BLD: 0.7 K/UL (ref 0.05–1.2)
MONOCYTES NFR BLD AUTO: 6 % (ref 3–10)
NEUTS SEG # BLD: 8.3 K/UL (ref 1.8–8)
NEUTS SEG NFR BLD AUTO: 72 % (ref 40–73)
PLATELET # BLD AUTO: 201 K/UL (ref 135–420)
PMV BLD AUTO: 11 FL (ref 9.2–11.8)
POTASSIUM SERPL-SCNC: 3.8 MMOL/L (ref 3.5–5.5)
PROT SERPL-MCNC: 6.8 G/DL (ref 6.4–8.2)
RBC # BLD AUTO: 4.01 M/UL (ref 4.2–5.3)
SODIUM SERPL-SCNC: 140 MMOL/L (ref 136–145)
SPECIMEN EXP DATE BLD: NORMAL
URATE SERPL-MCNC: 3.6 MG/DL (ref 2.6–7.2)
WBC # BLD AUTO: 11.4 K/UL (ref 4.6–13.2)

## 2017-01-14 PROCEDURE — 99218 HC RM OBSERVATION: CPT

## 2017-01-14 PROCEDURE — 74011250636 HC RX REV CODE- 250/636: Performed by: OBSTETRICS & GYNECOLOGY

## 2017-01-14 PROCEDURE — 96372 THER/PROPH/DIAG INJ SC/IM: CPT

## 2017-01-14 PROCEDURE — 96361 HYDRATE IV INFUSION ADD-ON: CPT

## 2017-01-14 PROCEDURE — 85025 COMPLETE CBC W/AUTO DIFF WBC: CPT | Performed by: OBSTETRICS & GYNECOLOGY

## 2017-01-14 PROCEDURE — 80053 COMPREHEN METABOLIC PANEL: CPT | Performed by: OBSTETRICS & GYNECOLOGY

## 2017-01-14 PROCEDURE — 86900 BLOOD TYPING SEROLOGIC ABO: CPT | Performed by: OBSTETRICS & GYNECOLOGY

## 2017-01-14 PROCEDURE — 74011250636 HC RX REV CODE- 250/636

## 2017-01-14 PROCEDURE — 84550 ASSAY OF BLOOD/URIC ACID: CPT | Performed by: OBSTETRICS & GYNECOLOGY

## 2017-01-14 PROCEDURE — 74011250637 HC RX REV CODE- 250/637: Performed by: OBSTETRICS & GYNECOLOGY

## 2017-01-14 PROCEDURE — 86870 RBC ANTIBODY IDENTIFICATION: CPT | Performed by: OBSTETRICS & GYNECOLOGY

## 2017-01-14 PROCEDURE — 59025 FETAL NON-STRESS TEST: CPT

## 2017-01-14 PROCEDURE — 83615 LACTATE (LD) (LDH) ENZYME: CPT | Performed by: OBSTETRICS & GYNECOLOGY

## 2017-01-14 PROCEDURE — 99285 EMERGENCY DEPT VISIT HI MDM: CPT

## 2017-01-14 PROCEDURE — 96360 HYDRATION IV INFUSION INIT: CPT

## 2017-01-14 RX ORDER — ACETAMINOPHEN 325 MG/1
650 TABLET ORAL
COMMUNITY
End: 2017-01-21

## 2017-01-14 RX ORDER — SODIUM CHLORIDE, SODIUM LACTATE, POTASSIUM CHLORIDE, CALCIUM CHLORIDE 600; 310; 30; 20 MG/100ML; MG/100ML; MG/100ML; MG/100ML
125 INJECTION, SOLUTION INTRAVENOUS CONTINUOUS
Status: DISCONTINUED | OUTPATIENT
Start: 2017-01-14 | End: 2017-01-15 | Stop reason: HOSPADM

## 2017-01-14 RX ORDER — BETAMETHASONE SODIUM PHOSPHATE AND BETAMETHASONE ACETATE 3; 3 MG/ML; MG/ML
INJECTION, SUSPENSION INTRA-ARTICULAR; INTRALESIONAL; INTRAMUSCULAR; SOFT TISSUE
Status: COMPLETED
Start: 2017-01-14 | End: 2017-01-14

## 2017-01-14 RX ORDER — ZOLPIDEM TARTRATE 5 MG/1
10 TABLET ORAL
Status: DISCONTINUED | OUTPATIENT
Start: 2017-01-14 | End: 2017-01-15 | Stop reason: HOSPADM

## 2017-01-14 RX ORDER — BETAMETHASONE SODIUM PHOSPHATE AND BETAMETHASONE ACETATE 3; 3 MG/ML; MG/ML
12 INJECTION, SUSPENSION INTRA-ARTICULAR; INTRALESIONAL; INTRAMUSCULAR; SOFT TISSUE EVERY 24 HOURS
Status: DISCONTINUED | OUTPATIENT
Start: 2017-01-14 | End: 2017-01-15 | Stop reason: HOSPADM

## 2017-01-14 RX ADMIN — ZOLPIDEM TARTRATE 10 MG: 5 TABLET, FILM COATED ORAL at 21:07

## 2017-01-14 RX ADMIN — BETAMETHASONE SODIUM PHOSPHATE AND BETAMETHASONE ACETATE 12 MG: 3; 3 INJECTION, SUSPENSION INTRA-ARTICULAR; INTRALESIONAL; INTRAMUSCULAR; SOFT TISSUE at 19:06

## 2017-01-14 RX ADMIN — BETAMETHASONE SODIUM PHOSPHATE AND BETAMETHASONE ACETATE 12 MG: 3; 3 INJECTION, SUSPENSION INTRA-ARTICULAR; INTRALESIONAL; INTRAMUSCULAR at 19:06

## 2017-01-14 RX ADMIN — SODIUM CHLORIDE, SODIUM LACTATE, POTASSIUM CHLORIDE, AND CALCIUM CHLORIDE 1000 ML: 600; 310; 30; 20 INJECTION, SOLUTION INTRAVENOUS at 18:15

## 2017-01-14 RX ADMIN — SODIUM CHLORIDE, SODIUM LACTATE, POTASSIUM CHLORIDE, AND CALCIUM CHLORIDE 125 ML/HR: 600; 310; 30; 20 INJECTION, SOLUTION INTRAVENOUS at 19:06

## 2017-01-14 NOTE — IP AVS SNAPSHOT
[address] Patient: Sb Dawn MRN: [id number] IVW:[id number] You are allergic to the following Allergen Reactions Iodine Hives Nausea and Vomiting MRI DYE Recent Documentation OB Status Smoking Status Pregnant Former Smoker Unresulted Labs Order Current Status LD In process Emergency Contacts Name Discharge Info Relation Home Work Mobile Josafat Peters  Spouse [3] [phone] About your hospitalization You were admitted on:  January 14, 2017 You last received care in the:  [address] [place] [address] You were discharged on:  January 14, 2017 Unit phone number:  [phone] Why you were hospitalized Your primary diagnosis was:  Not on File Providers Seen During Your Hospitalizations Provider Role Specialty Primary office phone Candi James DO Attending Provider Obstetrics & Gynecology [phone] Your Primary Care Physician (PCP) Primary Care Physician Office Phone Office Fax NONE ** None ** ** None ** Follow-up Information Follow up With Details Comments Contact Info None   None (395) Patient stated that they have no PCP Your Appointments Tuesday January 17, 2017 11:30 AM EST  
OB VISIT with Candi James DO  
[address] [place] [address] ([address]) [address] [facility] 83 [id number] [phone] Current Discharge Medication List  
  
ASK your doctor about these medications Dose & Instructions Dispensing Information Comments Morning Noon Evening Bedtime  
 labetalol 100 mg tablet Commonly known as:  Sharmin Vargas Your next dose is: Today, Tomorrow Other:  _________ Dose:  100 mg Take 1 Tab by mouth two (2) times a day. Quantity:  60 Tab Refills:  1  VELMA IM  
 Your next dose is: Today, Tomorrow Other:  _________  
   
   
 by IntraMUSCular route. Refills:  0  
     
   
   
   
  
 prenatal vit-iron fumarate-fa 28 mg iron- 800 mcg Tab Commonly known as:  PRENATAL PLUS with IRON Your next dose is: Today, Tomorrow Other:  _________ Dose:  1 Tab Take 1 Tab by mouth daily. Indications: PREGNANCY Quantity:  30 Tab Refills:  11 Substitution allowed TYLENOL 325 mg tablet Generic drug:  acetaminophen Your next dose is: Today, Tomorrow Other:  _________ Dose:  650 mg Take 650 mg by mouth every four (4) hours as needed for Pain. Indications: HEADACHE DISORDER Refills:  0 Discharge Instructions Patient armband removed and shredded. Follow up on Tuesday as scheduled for NST and with OB. Discharge Instructions Attachments/References PREGNANCY: HIGH BLOOD PRESSURE (ENGLISH) PREGNANCY: KICK COUNTS (ENGLISH) PREGNANCY: PRECAUTIONS (ENGLISH) PREGNANCY: WEEKS 34 TO 36 (ENGLISH) Discharge Orders None Introducing [facility]! Dear Margarito Book: Thank you for requesting a [employer] account. Our records indicate that you already have an active [employer] account. You can access your account anytime at https://"[employer]". HealthEngine/"[employer]" Did you know that you can access your hospital and ER discharge instructions at any time in [employer]? You can also review all of your test results from your hospital stay or ER visit. Additional Information If you have questions, please visit the Frequently Asked Questions section of the [employer] website at https://"[employer]". [employer]/. Remember, [employer] is NOT to be used for urgent needs. For medical emergencies, dial 911. Now available from your iPhone and Android! General Information Please provide this summary of care documentation to your next provider. Patient Signature:  ____________________________________________________________ Date:  ____________________________________________________________  
  
Paulene Greener Provider Signature:  ____________________________________________________________ Date:  ____________________________________________________________ More Information High Blood Pressure in Pregnancy: Care Instructions Your Care Instructions High blood pressure (hypertension) means that the force of blood against your artery walls is too strong. Mild high blood pressure during pregnancy is not usually dangerous. Your doctor will probably just want to watch you closely. But when blood pressure is very high, it can reduce oxygen to your baby. This can affect how well your baby grows. High blood pressure also means that you are at higher risk for: · Preeclampsia. This is a problem that includes high blood pressure and damage to your liver or kidneys. It can also reduce how much oxygen your baby gets. In some cases, it leads to eclampsia. Eclampsia causes seizures. · Placental abruption. This is a problem when the placenta separates from the uterus before birth. It prevents the baby from getting enough oxygen and nutrients. Sometimes it can cause death for the baby and the mother. To prevent problems for you or your baby, you will have to check your blood pressure often. You will do this until after your baby is born. If your blood pressure rises suddenly or is very high during your pregnancy, your doctor may prescribe medicines. They can usually control blood pressure. If your blood pressure affects your or your baby's health, your doctor may need to deliver your baby early. After your baby is born, your blood pressure will probably improve. But sometimes blood pressure problems continue after birth. Follow-up care is a key part of your treatment and safety. Be sure to make and go to all appointments, and call your doctor if you are having problems. It's also a good idea to know your test results and keep a list of the medicines you take. How can you care for yourself at home? · Take and write down your blood pressure at home if your doctor tells you to. · Take your medicines exactly as prescribed. Call your doctor if you think you are having a problem with your medicine. · Do not smoke. If you need help quitting, talk to your doctor about stop-smoking programs and medicines. These can increase your chances of quitting for good. · Do not gain too much weight during your pregnancy. Talk to your doctor about how much weight gain is healthy. · Eat a healthy diet. · Don't use a lot of salt. Take the salt shaker off the table. · Get regular, mild exercise. Walking or swimming several times a week can be healthy for you and your baby. · Reduce stress, and find time to relax. This is very important if you continue to work or have a busy schedule. It's also important if you have small children at home. When should you call for help? Call 911 anytime you think you may need emergency care. For example, call if: 
· You passed out (lost consciousness). · You have a seizure. Call your doctor now or seek immediate medical care if: 
· You have symptoms of preeclampsia, such as: 
¨ Sudden swelling of your face, hands, or feet. ¨ New vision problems (such as dimness or blurring). ¨ A severe headache. · Your blood pressure is higher than it should be or rises suddenly. · You have new nausea or vomiting. · You think that you are in labor. · You have new belly pain. Watch closely for changes in your health, and be sure to contact your doctor if: 
· You gain weight rapidly. Where can you learn more? Go to http://pancho-humberto.info/. Enter [phone] in the search box to learn more about \"High Blood Pressure in Pregnancy: Care Instructions. \" Current as of: May 30, 2016 Content Version: 11.1 © 2580-2493 [employer]. Care instructions adapted under license by [employer] (which disclaims liability or warranty for this information). If you have questions about a medical condition or this instruction, always ask your healthcare professional. [address] any warranty or liability for your use of this information. Counting Your Baby's Kicks: Care Instructions Your Care Instructions Counting your baby's kicks is one way your doctor can tell that your baby is healthy. Most womenespecially in a first pregnancyfeel their baby move for the first time between 16 and 22 weeks. The movement may feel like flutters rather than kicks. Your baby may move more at certain times of the day. When you are active, you may notice less kicking than when you are resting. At your prenatal visits, your doctor will ask whether the baby is active. In your last trimester, your doctor may ask you to count the number of times you feel your baby move. Follow-up care is a key part of your treatment and safety. Be sure to make and go to all appointments, and call your doctor if you are having problems. It's also a good idea to know your test results and keep a list of the medicines you take. How do you count fetal kicks? · A common method of checking your baby's movement is to count the number of kicks or moves you feel in 1 hour. Ten movements (such as kicks, flutters, or rolls) in 1 hour are normal. Some doctors suggest that you count in the morning until you get to 10 movements. Then you can quit for that day and start again the next day. · Pick your baby's most active time of day to count. This may be any time from morning to evening. · If you do not feel 10 movements in an hour, your baby may be sleeping. Wait for the next hour and count again. When should you call for help? Call your doctor now or seek immediate medical care if: 
· You noticed that your baby has stopped moving or is moving much less than normal. 
Watch closely for changes in your health, and be sure to contact your doctor if you have any problems. Where can you learn more? Go to http://pancho-humberto.info/. Enter B747 in the search box to learn more about \"Counting Your Baby's Kicks: Care Instructions. \" Current as of: May 30, 2016 Content Version: 11.1 © 5673-9864 [employer]. Care instructions adapted under license by Beta[employer] (which disclaims liability or warranty for this information). If you have questions about a medical condition or this instruction, always ask your healthcare professional. [address] any warranty or liability for your use of this information. Pregnancy Precautions: Care Instructions Your Care Instructions There is no sure way to prevent labor before your due date ( labor) or to prevent most other pregnancy problems. But there are things you can do to increase your chances of a healthy pregnancy. Go to your appointments, follow your doctor's advice, and take good care of yourself. Eat well, and exercise (if your doctor agrees). And make sure to drink plenty of water. Follow-up care is a key part of your treatment and safety. Be sure to make and go to all appointments, and call your doctor if you are having problems. It's also a good idea to know your test results and keep a list of the medicines you take. How can you care for yourself at home? · Make sure you go to your prenatal appointments. At each visit, your doctor will check your blood pressure. Your doctor will also check to see if you have protein in your urine. High blood pressure and protein in urine are signs of preeclampsia.  This condition can be dangerous for you and your baby. · Drink plenty of fluids, enough so that your urine is light yellow or clear like water. Dehydration can cause contractions. If you have kidney, heart, or liver disease and have to limit fluids, talk with your doctor before you increase the amount of fluids you drink. · Tell your doctor right away if you notice any symptoms of an infection, such as: ¨ Burning when you urinate. ¨ A foul-smelling discharge from your vagina. ¨ Vaginal itching. ¨ Unexplained fever. ¨ Unusual pain or soreness in your uterus or lower belly. · Eat a balanced diet. Include plenty of foods that are high in calcium and iron. ¨ Foods high in calcium include milk, cheese, yogurt, almonds, and broccoli. ¨ Foods high in iron include red meat, shellfish, poultry, eggs, beans, raisins, whole-grain bread, and leafy green vegetables. · Do not smoke. If you need help quitting, talk to your doctor about stop-smoking programs and medicines. These can increase your chances of quitting for good. · Do not drink alcohol or use illegal drugs. · Follow your doctor's directions about activity. Your doctor will let you know how much, if any, exercise you can do. · Ask your doctor if you can have sex. If you are at risk for early labor, your doctor may ask you to not have sex. · Take care to prevent falls. During pregnancy, your joints are loose, and your balance is off. Sports such as bicycling, skiing, or in-line skating can increase your risk of falling. And don't ride horses or motorcycles, dive, water ski, scuba dive, or parachute jump while you are pregnant. · Avoid getting very hot. Do not use saunas or hot tubs. Avoid staying out in the sun in hot weather for long periods. Take acetaminophen (Tylenol) to lower a high fever. · Do not take any over-the-counter or herbal medicines or supplements without talking to your doctor or pharmacist first. 
When should you call for help? Call 911 anytime you think you may need emergency care. For example, call if: 
· You passed out (lost consciousness). · You have severe vaginal bleeding. · You have severe pain in your belly or pelvis. · You have had fluid gushing or leaking from your vagina and you know or think the umbilical cord is bulging into your vagina. If this happens, immediately get down on your knees so your rear end (buttocks) is higher than your head. This will decrease the pressure on the cord until help arrives. Call your doctor now or seek immediate medical care if: 
· You have signs of preeclampsia, such as: 
¨ Sudden swelling of your face, hands, or feet. ¨ New vision problems (such as dimness or blurring). ¨ A severe headache. · You have any vaginal bleeding. · You have belly pain or cramping. · You have a fever. · You have had regular contractions (with or without pain) for an hour. This means that you have 8 or more within 1 hour or 4 or more in 20 minutes after you change your position and drink fluids. · You have a sudden release of fluid from your vagina. · You have low back pain or pelvic pressure that does not go away. · You notice that your baby has stopped moving or is moving much less than normal. 
Watch closely for changes in your health, and be sure to contact your doctor if you have any problems. Where can you learn more? Go to http://pancho-humberto.info/. Enter [id number] in the search box to learn more about \"Pregnancy Precautions: Care Instructions. \" Current as of: May 30, 2016 Content Version: 11.1 © 9719-3531 [employer]. Care instructions adapted under license by [employer] (which disclaims liability or warranty for this information). If you have questions about a medical condition or this instruction, always ask your healthcare professional. [address] any warranty or liability for your use of this information. Weeks 34 to 36 of Your Pregnancy: Care Instructions Your Care Instructions By now, your baby and your belly have grown quite large. It is almost time to give birth. A full-term pregnancy can deliver between 37 and 42 weeks. Your baby's lungs are almost ready to breathe air. The bones in your baby's head are now firm enough to protect it, but soft enough to move down through the birth canal. 
You may feel excited, happy, anxious, or scared. You may wonder how you will know if you are in labor or what to expect during labor. Try to be flexible in your expectations of the birth. Because each birth is different, there is no way to know exactly what childbirth will be like for you. This care sheet will help you know what to expect and how to prepare. This may make your childbirth easier. If you haven't already had the Tdap shot during this pregnancy, talk to your doctor about getting it. It will help protect your  against pertussis infection. In the 36th week, most women have a test for group B streptococcus (GBS). GBS is a common bacteria that can live in the vagina and rectum. It can make your baby sick after birth. If you test positive, you will get antibiotics during labor. The medicine will keep your baby from getting the bacteria. Follow-up care is a key part of your treatment and safety. Be sure to make and go to all appointments, and call your doctor if you are having problems. It's also a good idea to know your test results and keep a list of the medicines you take. How can you care for yourself at home? Learn about pain relief choices · Pain is different for every woman. Talk with your doctor about your feelings about pain. · You can choose from several types of pain relief. These include medicine or breathing techniques, as well as comfort measures. You can use more than one option.  
· If you choose to have pain medicine during labor, talk to your doctor about your options. Some medicines lower anxiety and help with some of the pain. Others make your lower body numb so that you won't feel pain. · Be sure to tell your doctor about your pain medicine choice before you start labor or very early in your labor. You may be able to change your mind as labor progresses. · Rarely, a woman is put to sleep by medicine given through a mask or an IV. Labor and delivery · The first stage of labor has three parts: early, active, and transition. ¨ Most women have early labor at home. You can stay busy or rest, eat light snacks, drink clear fluids, and start counting contractions. ¨ When talking during a contraction gets hard, you may be moving to active labor. During active labor, you should head for the hospital if you are not there already. ¨ You are in active labor when contractions come every 3 to 4 minutes and last about 60 seconds. Your cervix is opening more rapidly. ¨ If your water breaks, contractions will come faster and stronger. ¨ During transition, your cervix is stretching, and contractions are coming more rapidly. ¨ You may want to push, but your cervix might not be ready. Your doctor will tell you when to push. · The second stage starts when your cervix is completely opened and you are ready to push. ¨ Contractions are very strong to push the baby down the birth canal. 
¨ You will feel the urge to push. You may feel like you need to have a bowel movement. ¨ You may be coached to push with contractions. These contractions will be very strong, but you will not have them as often. You can get a little rest between contractions. ¨ You may be emotional and irritable. You may not be aware of what is going on around you. ¨ One last push, and your baby is born. · The third stage is when a few more contractions push out the placenta. This may take 30 minutes or less. · The fourth stage is the welcome recovery.  You may feel overwhelmed with emotions and exhausted but alert. This is a good time to start breastfeeding. Where can you learn more? Go to http://pancho-humberto.info/. Enter Z369 in the search box to learn more about \"Weeks 34 to 36 of Your Pregnancy: Care Instructions. \" Current as of: May 30, 2016 Content Version: 11.1 © 2765-9653 [employer]. Care instructions adapted under license by [employer] (which disclaims liability or warranty for this information). If you have questions about a medical condition or this instruction, always ask your healthcare professional. [address] any warranty or liability for your use of this information.

## 2017-01-14 NOTE — IP AVS SNAPSHOT
Summary of Care Report The Summary of Care report has been created to help improve care coordination. Users with access to [employer] or [employer] [facility] (Web-based application) may access additional patient information including the Discharge Summary. If you are not currently a [address] user and need more information, please call the number listed below in the [address] section and ask to be connected with Medical Records. Facility Information Name Address Phone [facility] [address] [facility] 83 [address] [phone] Patient Information Patient Name Sex  Sabrina Siddiqui ([id number]) Female 1980 Discharge Information Admitting Provider Service Area Unit Jeevan Márquez MD / Diana Toth 92 3 Labor & Delivery / [phone] Discharge Provider Discharge Date/Time Discharge Disposition Destination (none) 2017 (Pending) AHR (none) Patient Language Language ENGLISH [13] Problem List as of 2017  Date Reviewed: 1/10/2017 Codes Priority Class Noted - Resolved Chronic hypertension ICD-10-CM: I10 
ICD-9-CM: 401.9   2016 - Present RESOLVED: History of  delivery, currently pregnant in first trimester ICD-10-CM: O09.211 ICD-9-CM: V23.41   2016 - 2016 Adult celiac disease ICD-10-CM: K90.0 ICD-9-CM: 579.0   2016 - Present History of ectopic pregnancy ICD-10-CM: Z87.59 
ICD-9-CM: V13.29   2016 - Present Rh negative status during pregnancy in second trimester, antepartum ICD-10-CM: O09.892 ICD-9-CM: V23.89   2016 - Present High-risk pregnancy ICD-10-CM: O09.90 ICD-9-CM: V23.9   2016 - Present Hx of  delivery, currently pregnant ICD-10-CM: O09.219 ICD-9-CM: V23.41   2016 - Present Choroid plexus cyst of fetus ICD-10-CM: O35. 0LR6 ICD-9-CM: 655.00   2016 - Present Overview Signed 2016 11:30 AM by Jeff Nam DO  
  bilateral 
  
  
 H/O:  ICD-10-CM: F48.600 ICD-9-CM: V45.89   10/6/2016 - Present Overview Signed 10/6/2016  2:26 PM by Jeff Nam DO  
  x2 RESOLVED: 31 weeks gestation of pregnancy ICD-10-CM: Z3A.31 
ICD-9-CM: V22.2   2016 - 1/10/2017 Non-stress test reactive on fetal surveillance ICD-10-CM: Z36 ICD-9-CM: V28.89   2016 - Present RESOLVED: 33 weeks gestation of pregnancy ICD-10-CM: Z3A.33 
ICD-9-CM: V22.2   2016 - 1/10/2017 Premature cervical dilation in third trimester ICD-10-CM: O34.33 
ICD-9-CM: 654.53   2016 - Present Overview Signed 2016  3:27 PM by Fantasma Clark DO  
  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 
ICD-9-CM: V22.2   2017 - 1/10/2017 RESOLVED: Dehydration ICD-10-CM: E86.0 ICD-9-CM: 276.51   2017 - 1/10/2017 Asymptomatic microscopic hematuria ICD-10-CM: R31.21 
ICD-9-CM: 599.72   2017 - Present 35 weeks gestation of pregnancy ICD-10-CM: Z3A.35 
ICD-9-CM: V22.2   1/10/2017 - Present You are allergic to the following Allergen Reactions Iodine Hives Nausea and Vomiting MRI DYE Current Discharge Medication List  
  
ASK your doctor about these medications Dose & Instructions Dispensing Information Comments  
 labetalol 100 mg tablet Commonly known as:  Abril Valdez Dose:  100 mg Take 1 Tab by mouth two (2) times a day. Quantity:  60 Tab Refills:  1 VELMA IM  
 by IntraMUSCular route. Refills:  0  
   
 prenatal vit-iron fumarate-fa 28 mg iron- 800 mcg Tab Commonly known as:  PRENATAL PLUS with IRON  
 Dose:  1 Tab Take 1 Tab by mouth daily. Indications: PREGNANCY Quantity:  30 Tab Refills:  11 Substitution allowed TYLENOL 325 mg tablet Generic drug:  acetaminophen Dose:  650 mg Take 650 mg by mouth every four (4) hours as needed for Pain. Indications: HEADACHE DISORDER Refills:  0 Current Immunizations Name Date Influenza Vaccine 10/6/2016 Follow-up Information Follow up With Details Comments Contact Info None   None (395) Patient stated that they have no PCP Discharge Instructions Patient armband removed and shredded. Follow up on Tuesday as scheduled for NST and with OB. Chart Review Routing History No Routing History on File

## 2017-01-14 NOTE — PROGRESS NOTES
354/7 wks arrived dwith c/o ctx q 8 min since 1000  She thinks she is leaking fluid also since 1000  She is a previous c/s x 2 and for   schedc/s   Pt ate a grilled cheese sandwich at 1730  efm applied  ve done  cx /-3  Post  Pt with hx  htn on labetalol bid  She c/o ha and visual floaters  0815  Iv started  Bolus begun  Labs drawn nitrazine neg    Pt has thick vag disch     Report of cx exam ,ha,prev c/s x 2 and admission b/p to dr Evelin Manrique  Orders to send labs    Steroid inj given  amniostat neg up to void  Report to oncmaco rosas

## 2017-01-14 NOTE — IP AVS SNAPSHOT
Current Discharge Medication List  
  
ASK your doctor about these medications Dose & Instructions Dispensing Information Comments Morning Noon Evening Bedtime  
 labetalol 100 mg tablet Commonly known as:  David Mcclellan Your next dose is: Today, Tomorrow Other:  ____________ Dose:  100 mg Take 1 Tab by mouth two (2) times a day. Quantity:  60 Tab Refills:  1 VELMA IM Your next dose is: Today, Tomorrow Other:  ____________  
   
   
 by IntraMUSCular route. Refills:  0  
     
   
   
   
  
 prenatal vit-iron fumarate-fa 28 mg iron- 800 mcg Tab Commonly known as:  PRENATAL PLUS with IRON Your next dose is: Today, Tomorrow Other:  ____________ Dose:  1 Tab Take 1 Tab by mouth daily. Indications: PREGNANCY Quantity:  30 Tab Refills:  11 Substitution allowed TYLENOL 325 mg tablet Generic drug:  acetaminophen Your next dose is: Today, Tomorrow Other:  ____________ Dose:  650 mg Take 650 mg by mouth every four (4) hours as needed for Pain. Indications: HEADACHE DISORDER Refills:  0

## 2017-01-15 LAB — LDH SERPL L TO P-CCNC: 151 U/L (ref 81–234)

## 2017-01-15 NOTE — PROGRESS NOTES
Paged and spoke with Dr Cristiana Reid MD reviewed labs, updated on SVE- no change, ctx occasional per pt around every \"10 or so min\" and they last 20-40 seconds. Pt reports they are uncomfortable to her like bad menstural cramps. Per Dr Cristiana Reid pt may stay and theraputic rest overnight with vistrail and morphine or go home and she may take 10 mg ambien before going home.

## 2017-01-15 NOTE — PROGRESS NOTES
Spoke with pt with options for going home with ambien vs staying her and she states she will go home. Reviewed d/c instructions, labor precautions, when to call MD, miryam counts. Pt verbalized understanding. Pt has an OB appt Tuesday and for an NST.

## 2017-01-15 NOTE — PROGRESS NOTES
Pt reports she is still having occasional ( aprox every 10 min ) cramps in her lower abdomen. She reports they are more mild than earlier when she came to the hospital. Pt still has slight mild headache, noted having occasional headaches through out pregnancy. Instructed pt ok to continue tylenol as needed for headache.

## 2017-01-15 NOTE — PROGRESS NOTES
Bedside and Verbal shift change report given to Wayne (oncoming nurse) by Jimbo Renteria RN (offgoing nurse). Report included the following information SBAR, Kardex, Procedure Summary, Intake/Output, MAR, Recent Results and Med Rec Status   Pt c/o ctx aprox every 10 min  She reports headache but starting to get better and seeing floaters she denies RUQ pain, reflexes 2+, no clonus present.

## 2017-01-16 ENCOUNTER — TELEPHONE (OUTPATIENT)
Dept: OBGYN CLINIC | Age: 37
End: 2017-01-16

## 2017-01-16 ENCOUNTER — HOSPITAL ENCOUNTER (OUTPATIENT)
Age: 37
Discharge: HOME | End: 2017-01-16
Attending: OBSTETRICS & GYNECOLOGY | Admitting: OBSTETRICS & GYNECOLOGY
Payer: MEDICAID

## 2017-01-16 VITALS — SYSTOLIC BLOOD PRESSURE: 137 MMHG | TEMPERATURE: 98.5 F | HEART RATE: 102 BPM | DIASTOLIC BLOOD PRESSURE: 84 MMHG

## 2017-01-16 PROBLEM — O47.03 FALSE LABOR BEFORE 37 COMPLETED WEEKS OF GESTATION IN THIRD TRIMESTER: Status: ACTIVE | Noted: 2017-01-16

## 2017-01-16 PROCEDURE — 59025 FETAL NON-STRESS TEST: CPT

## 2017-01-16 PROCEDURE — 99218 HC RM OBSERVATION: CPT

## 2017-01-16 NOTE — ROUTINE PROCESS
Received  356/7wks black female from home for decreased fetal movement, history chronic hypertension, con trolled by cayden. Declines any h/a no blurred vision,1620 Dr Jane Hernandez viewed nst ,discussed discharge to home and schedule appointment in office Thursday with nst. Patient/fob at bedside voiced [id number] Patient discharged to home via ambulatory to home.

## 2017-01-16 NOTE — TELEPHONE ENCOUNTER
Patient called to report no fetal movement today , advised patient to present to  L&D for evaluation, patient report eating  and drinking a smoothie  and lying on left side   And no movement in more than 8 hours

## 2017-01-16 NOTE — PROGRESS NOTES
Antepartum surveillance:   Indication:    Chronic hypertension I10    High-risk pregnancy O09.90    Hx of  delivery, currently pregnant O1.18    H/O:  Z98.891    Premature cervical dilation in third trimester, s/p betamethasone x2. O34.33    35 weeks gestation of pregnancy Z3A.35     Presenting symptoms and findings discussed with RN. Remote EFM reviewed. FHT:  140 moderate variability, accels present, no decels. [place]:  Irritability (patient reported q 10 mins), improved with IV hydration. Cervix unchanged on serial pelvic checks:  /-3. Labs reviewed. A/P   Chronic hypertension I10    High-risk pregnancy O09.90    Hx of  delivery, currently pregnant O09.219    Non-stress test reactive on fetal surveillance Z36    Premature cervical dilation in third trimester O34.33    35 weeks gestation of pregnancy Z3A.35    False labor before 37 completed weeks of gestation in third trimester O47.03     Patient offered therapeutic rest vs home with sleep aid. Patient preferred DC home. Precautions given. -follow up as scheduled.

## 2017-01-16 NOTE — PROGRESS NOTES
Labor and delivery screening visit  Triage Visit    Name: John Pineda MRN: [id number]  SSN: [id number]    YOB: 1980  Age: 31 y.o. Sex: female        Koki Collazo is a 31 y.o. G5  female who is due 2017, by Last Menstrual Period. This makes her 35w6d. She  is being seen for decreased fetal movement. She is being seen in screening location [address] [place] [address]. Patient denies LOF/VB/ctx. Additional Diagnoses:Present on Admission:  **None**       Historical Additional  Problem List.:   Problem List as of 2017  Date Reviewed: 1/10/2017          Codes Class Noted - Resolved    False labor before 40 completed weeks of gestation in third trimester ICD-10-CM: O47.03  ICD-9-CM: 644.03  2017 - Present        35 weeks gestation of pregnancy ICD-10-CM: Z3A.35  ICD-9-CM: V22.2  1/10/2017 - Present        Asymptomatic microscopic hematuria ICD-10-CM: R31.21  ICD-9-CM: 599.72  2017 - Present        GBS carrier ICD-10-CM: Z22.330  ICD-9-CM: V02.51  2017 - Present        Premature cervical dilation in third trimester ICD-10-CM: O34.33  ICD-9-CM: 654.53  2016 - Present    Overview Signed 2016  3:27 PM by Roslyn Narayan,      33w2d             Non-stress test reactive on fetal surveillance ICD-10-CM: Z36  ICD-9-CM: V28.89  2016 - Present        H/O:  ICD-10-CM: Z98.891  ICD-9-CM: V45.89  10/6/2016 - Present    Overview Signed 10/6/2016  2:26 PM by Emely Fofana,      x2             Choroid plexus cyst of fetus ICD-10-CM: O35. 0XX0  ICD-9-CM: 655.00  2016 - Present    Overview Signed 2016 11:30 AM by Emely Fofana, DO     bilateral             High-risk pregnancy ICD-10-CM: O09.90  ICD-9-CM: V23.9  2016 - Present        Hx of  delivery, currently pregnant ICD-10-CM: O09.219  ICD-9-CM: V23.41  2016 - Present        Rh negative status during pregnancy in second trimester, antepartum ICD-10-CM: O09.892  ICD-9-CM: V23.89  2016 - Present        Chronic hypertension ICD-10-CM: I10  ICD-9-CM: 401.9  2016 - Present        Adult celiac disease ICD-10-CM: K90.0  ICD-9-CM: 579.0  2016 - Present        History of ectopic pregnancy ICD-10-CM: Z87.59  ICD-9-CM: V13.29  2016 - Present        RESOLVED: 34 weeks gestation of pregnancy ICD-10-CM: Z3A.34  ICD-9-CM: V22.2  2017 - 1/10/2017        RESOLVED: Dehydration ICD-10-CM: E86.0  ICD-9-CM: 276.51  2017 - 1/10/2017        RESOLVED: 33 weeks gestation of pregnancy ICD-10-CM: Z3A.33  ICD-9-CM: V22.2  2016 - 1/10/2017        RESOLVED: Encounter for suspected PROM, with rupture of membranes not found ICD-10-CM: Z03.71  ICD-9-CM: V89.01  2016 - 1/10/2017        RESOLVED: 31 weeks gestation of pregnancy ICD-10-CM: Z3A.31  ICD-9-CM: V22.2  2016 - 1/10/2017        RESOLVED: History of  delivery, currently pregnant in first trimester ICD-10-CM: O09.211  ICD-9-CM: V23.41  2016 - 2016             Allergies   Allergen Reactions    Iodine Hives and Nausea and Vomiting     MRI DYE      Past Medical History   Diagnosis Date    Abnormal Papanicolaou smear of cervix     Adult celiac disease 2016    GBS carrier 2017    H/O:  10/6/2016    History of ectopic pregnancy 2016    History of  delivery, currently pregnant in first trimester 2016    Hypertension     Rh negative status during pregnancy in second trimester, antepartum 2016       Prior to Admission medications    Medication Sig Start Date End Date Taking? Authorizing Provider   acetaminophen (TYLENOL) 325 mg tablet Take 650 mg by mouth every four (4) hours as needed for Pain. Indications: HEADACHE DISORDER    Historical Provider   HYDROXYPROGESTERONE CAPROATE (VELMA IM) by IntraMUSCular route. Historical Provider   labetalol (NORMODYNE) 100 mg tablet Take 1 Tab by mouth two (2) times a day.  16   Adam Rome, DO   prenatal vit-iron fumarate-fa (PRENATAL PLUS WITH IRON) 28 mg iron- 800 mcg tab Take 1 Tab by mouth daily. Indications: PREGNANCY 10/6/16   Sonny Barron DO        Objective:  Visit Vitals    /84    Pulse (!) 102    Temp 98.5 °F (36.9 °C)       Prenatal Labs:   No results found for: RUBELLAEXT, GRBSEXT, HBSAGEXT, HIVEXT, RPREXT, GONNOEXT, CHLAMEXT    WBC   Date/Time Value Ref Range Status   2017 06:15 PM 11.4 4.6 - 13.2 K/uL Final   01/10/2017 12:00 PM 12.9 (H) 3.4 - 10.8 x10E3/uL Final   2016 11:50 AM 9.5 4.6 - 13.2 K/uL Final     HGB   Date/Time Value Ref Range Status   2017 06:15 PM 12.4 12.0 - 16.0 g/dL Final   01/10/2017 12:00 PM 12.8 11.1 - 15.9 g/dL Final   2016 11:50 AM 11.2 (L) 12.0 - 16.0 g/dL Final     PLATELET   Date/Time Value Ref Range Status   2017 06:15  135 - 420 K/uL Final       Labs:  No results found for this or any previous visit (from the past 24 hour(s)). Fetal Heart Rate:   Fetal Non-stress Test: Reactive  Royal:  quiet           Estimated Date of Delivery: 17  OB History      Para Term  AB TAB SAB Ectopic Multiple Living    5 3 2 1 1  0 1  2            Physical Exam:   Patient without distress  HEENT: No obvious head trauma, she can hear at a conversational level, vision is adequate, and no choking or difficulty swallowing. Neurologically: She oriented to time and place as well as understands her situation and give a good medical history. Abdomen: benign, gravid, nontender, no CVA tenderness; fundal size appropriate to said gestational age  Pelvic: External genitalia normal without inflammation, lesions or abnormal discharge  Extremities: with some swelling but not hyperreflexia  CervicalExam: / / /  deferred             Impression/Plan:   Decreased fetal movement    Plan:  Reassurance given. F/u as previously scheduled.                                                           Signed By:  Sonny Barron DO     2017

## 2017-01-18 ENCOUNTER — ANESTHESIA EVENT (OUTPATIENT)
Dept: LABOR AND DELIVERY | Age: 37
DRG: 540 | End: 2017-01-18
Payer: MEDICAID

## 2017-01-18 ENCOUNTER — HOSPITAL ENCOUNTER (INPATIENT)
Age: 37
LOS: 3 days | Discharge: HOME | DRG: 540 | End: 2017-01-21
Attending: OBSTETRICS & GYNECOLOGY | Admitting: OBSTETRICS & GYNECOLOGY
Payer: MEDICAID

## 2017-01-18 ENCOUNTER — ANESTHESIA (OUTPATIENT)
Dept: LABOR AND DELIVERY | Age: 37
DRG: 540 | End: 2017-01-18
Payer: MEDICAID

## 2017-01-18 ENCOUNTER — SURGERY (OUTPATIENT)
Age: 37
End: 2017-01-18

## 2017-01-18 LAB
APPEARANCE UR: CLEAR
BASOPHILS # BLD AUTO: 0 K/UL (ref 0–0.06)
BASOPHILS # BLD: 0 % (ref 0–2)
BILIRUB UR QL: NEGATIVE
COLOR UR: YELLOW
DIFFERENTIAL METHOD BLD: ABNORMAL
EOSINOPHIL # BLD: 0.1 K/UL (ref 0–0.4)
EOSINOPHIL NFR BLD: 1 % (ref 0–5)
ERYTHROCYTE [DISTWIDTH] IN BLOOD BY AUTOMATED COUNT: 13.8 % (ref 11.6–14.5)
GLUCOSE UR QL STRIP.AUTO: NEGATIVE MG/DL
HCT VFR BLD AUTO: 40.3 % (ref 35–45)
HGB BLD-MCNC: 13.5 G/DL (ref 12–16)
KETONES UR-MCNC: 80 MG/DL
LEUKOCYTE ESTERASE UR QL STRIP: ABNORMAL
LYMPHOCYTES # BLD AUTO: 13 % (ref 21–52)
LYMPHOCYTES # BLD: 1.9 K/UL (ref 0.9–3.6)
MCH RBC QN AUTO: 31.5 PG (ref 24–34)
MCHC RBC AUTO-ENTMCNC: 33.5 G/DL (ref 31–37)
MCV RBC AUTO: 94.2 FL (ref 74–97)
MONOCYTES # BLD: 0.8 K/UL (ref 0.05–1.2)
MONOCYTES NFR BLD AUTO: 5 % (ref 3–10)
NEUTS SEG # BLD: 12 K/UL (ref 1.8–8)
NEUTS SEG NFR BLD AUTO: 81 % (ref 40–73)
NITRITE UR QL: NEGATIVE
PH UR: 7.5 [PH] (ref 5–8)
PLATELET # BLD AUTO: 208 K/UL (ref 135–420)
PMV BLD AUTO: 11.4 FL (ref 9.2–11.8)
PROT UR QL: 30 MG/DL
RBC # BLD AUTO: 4.28 M/UL (ref 4.2–5.3)
RBC # UR STRIP: ABNORMAL /UL
RPR SER QL: NONREACTIVE
SP GR UR: 1.02 (ref 1–1.03)
UROBILINOGEN UR QL: 0.2 EU/DL (ref 0.2–1)
WBC # BLD AUTO: 14.8 K/UL (ref 4.6–13.2)

## 2017-01-18 PROCEDURE — 86900 BLOOD TYPING SEROLOGIC ABO: CPT | Performed by: OBSTETRICS & GYNECOLOGY

## 2017-01-18 PROCEDURE — 77030020255 HC SOL INJ LR 1000ML BG

## 2017-01-18 PROCEDURE — 77030002933 HC SUT MCRYL J&J -A: Performed by: OBSTETRICS & GYNECOLOGY

## 2017-01-18 PROCEDURE — 99218 HC RM OBSERVATION: CPT

## 2017-01-18 PROCEDURE — 77030031139 HC SUT VCRL2 J&J -A: Performed by: OBSTETRICS & GYNECOLOGY

## 2017-01-18 PROCEDURE — 74011000250 HC RX REV CODE- 250

## 2017-01-18 PROCEDURE — 74011250637 HC RX REV CODE- 250/637

## 2017-01-18 PROCEDURE — 74011250636 HC RX REV CODE- 250/636

## 2017-01-18 PROCEDURE — 76060000078 HC EPIDURAL ANESTHESIA: Performed by: OBSTETRICS & GYNECOLOGY

## 2017-01-18 PROCEDURE — 77030005515 HC CATH URETH FOL14 BARD -B: Performed by: OBSTETRICS & GYNECOLOGY

## 2017-01-18 PROCEDURE — 4A1H74Z MONITORING OF PRODUCTS OF CONCEPTION, CARDIAC ELECTRICAL ACTIVITY, VIA NATURAL OR ARTIFICIAL OPENING: ICD-10-PCS | Performed by: OBSTETRICS & GYNECOLOGY

## 2017-01-18 PROCEDURE — 85025 COMPLETE CBC W/AUTO DIFF WBC: CPT | Performed by: OBSTETRICS & GYNECOLOGY

## 2017-01-18 PROCEDURE — 65270000029 HC RM PRIVATE

## 2017-01-18 PROCEDURE — 87340 HEPATITIS B SURFACE AG IA: CPT | Performed by: OBSTETRICS & GYNECOLOGY

## 2017-01-18 PROCEDURE — 77030002888 HC SUT CHRMC J&J -A: Performed by: OBSTETRICS & GYNECOLOGY

## 2017-01-18 PROCEDURE — 76010000392 HC C SECN EA ADDL 0.5 HR: Performed by: OBSTETRICS & GYNECOLOGY

## 2017-01-18 PROCEDURE — 76010000391 HC C SECN FIRST 1 HR: Performed by: OBSTETRICS & GYNECOLOGY

## 2017-01-18 PROCEDURE — 86870 RBC ANTIBODY IDENTIFICATION: CPT | Performed by: OBSTETRICS & GYNECOLOGY

## 2017-01-18 PROCEDURE — 77030010507 HC ADH SKN DERMBND J&J -B: Performed by: OBSTETRICS & GYNECOLOGY

## 2017-01-18 PROCEDURE — 77030032490 HC SLV COMPR SCD KNE COVD -B: Performed by: OBSTETRICS & GYNECOLOGY

## 2017-01-18 PROCEDURE — 86592 SYPHILIS TEST NON-TREP QUAL: CPT | Performed by: OBSTETRICS & GYNECOLOGY

## 2017-01-18 PROCEDURE — 77030007866 HC KT SPN ANES BBMI -B: Performed by: ANESTHESIOLOGY

## 2017-01-18 PROCEDURE — 75410000003 HC RECOV DEL/VAG/CSECN EA 0.5 HR: Performed by: OBSTETRICS & GYNECOLOGY

## 2017-01-18 PROCEDURE — 86762 RUBELLA ANTIBODY: CPT | Performed by: OBSTETRICS & GYNECOLOGY

## 2017-01-18 PROCEDURE — 81003 URINALYSIS AUTO W/O SCOPE: CPT

## 2017-01-18 PROCEDURE — 74011250636 HC RX REV CODE- 250/636: Performed by: OBSTETRICS & GYNECOLOGY

## 2017-01-18 RX ORDER — SODIUM CHLORIDE, SODIUM LACTATE, POTASSIUM CHLORIDE, CALCIUM CHLORIDE 600; 310; 30; 20 MG/100ML; MG/100ML; MG/100ML; MG/100ML
125 INJECTION, SOLUTION INTRAVENOUS CONTINUOUS
Status: DISCONTINUED | OUTPATIENT
Start: 2017-01-18 | End: 2017-01-19

## 2017-01-18 RX ORDER — CEFAZOLIN SODIUM IN 0.9 % NACL 2 G/100 ML
PLASTIC BAG, INJECTION (ML) INTRAVENOUS AS NEEDED
Status: DISCONTINUED | OUTPATIENT
Start: 2017-01-18 | End: 2017-01-19 | Stop reason: HOSPADM

## 2017-01-18 RX ORDER — CEFAZOLIN SODIUM 2 G/50ML
2 SOLUTION INTRAVENOUS ONCE
Status: COMPLETED | OUTPATIENT
Start: 2017-01-18 | End: 2017-01-19

## 2017-01-18 RX ORDER — TRISODIUM CITRATE DIHYDRATE AND CITRIC ACID MONOHYDRATE 500; 334 MG/5ML; MG/5ML
SOLUTION ORAL
Status: COMPLETED
Start: 2017-01-18 | End: 2017-01-18

## 2017-01-18 RX ORDER — CEFAZOLIN SODIUM 2 G/50ML
SOLUTION INTRAVENOUS
Status: COMPLETED
Start: 2017-01-18 | End: 2017-01-18

## 2017-01-18 RX ORDER — KETOROLAC TROMETHAMINE 30 MG/ML
INJECTION, SOLUTION INTRAMUSCULAR; INTRAVENOUS AS NEEDED
Status: DISCONTINUED | OUTPATIENT
Start: 2017-01-18 | End: 2017-01-19 | Stop reason: HOSPADM

## 2017-01-18 RX ORDER — SODIUM CHLORIDE, SODIUM LACTATE, POTASSIUM CHLORIDE, CALCIUM CHLORIDE 600; 310; 30; 20 MG/100ML; MG/100ML; MG/100ML; MG/100ML
INJECTION, SOLUTION INTRAVENOUS
Status: DISCONTINUED | OUTPATIENT
Start: 2017-01-18 | End: 2017-01-19 | Stop reason: HOSPADM

## 2017-01-18 RX ORDER — OXYTOCIN 10 [USP'U]/ML
INJECTION, SOLUTION INTRAMUSCULAR; INTRAVENOUS AS NEEDED
Status: DISCONTINUED | OUTPATIENT
Start: 2017-01-18 | End: 2017-01-19 | Stop reason: HOSPADM

## 2017-01-18 RX ORDER — MORPHINE SULFATE 1 MG/ML
INJECTION, SOLUTION EPIDURAL; INTRATHECAL; INTRAVENOUS AS NEEDED
Status: DISCONTINUED | OUTPATIENT
Start: 2017-01-18 | End: 2017-01-19 | Stop reason: HOSPADM

## 2017-01-18 RX ORDER — OXYTOCIN/RINGER'S LACTATE 20/1000 ML
PLASTIC BAG, INJECTION (ML) INTRAVENOUS
Status: DISCONTINUED
Start: 2017-01-18 | End: 2017-01-19

## 2017-01-18 RX ORDER — DIPHENHYDRAMINE HYDROCHLORIDE 50 MG/ML
INJECTION, SOLUTION INTRAMUSCULAR; INTRAVENOUS AS NEEDED
Status: DISCONTINUED | OUTPATIENT
Start: 2017-01-18 | End: 2017-01-19 | Stop reason: HOSPADM

## 2017-01-18 RX ORDER — ONDANSETRON 2 MG/ML
INJECTION INTRAMUSCULAR; INTRAVENOUS AS NEEDED
Status: DISCONTINUED | OUTPATIENT
Start: 2017-01-18 | End: 2017-01-19 | Stop reason: HOSPADM

## 2017-01-18 RX ORDER — BUPIVACAINE HYDROCHLORIDE 7.5 MG/ML
INJECTION, SOLUTION INTRASPINAL AS NEEDED
Status: DISCONTINUED | OUTPATIENT
Start: 2017-01-18 | End: 2017-01-19 | Stop reason: HOSPADM

## 2017-01-18 RX ORDER — FENTANYL CITRATE 50 UG/ML
INJECTION, SOLUTION INTRAMUSCULAR; INTRAVENOUS AS NEEDED
Status: DISCONTINUED | OUTPATIENT
Start: 2017-01-18 | End: 2017-01-19 | Stop reason: HOSPADM

## 2017-01-18 RX ADMIN — MORPHINE SULFATE 2.5 MG: 1 INJECTION, SOLUTION EPIDURAL; INTRATHECAL; INTRAVENOUS at 23:38

## 2017-01-18 RX ADMIN — Medication 2 G: at 23:14

## 2017-01-18 RX ADMIN — OXYTOCIN 20 UNITS: 10 INJECTION, SOLUTION INTRAMUSCULAR; INTRAVENOUS at 23:29

## 2017-01-18 RX ADMIN — SODIUM CHLORIDE, SODIUM LACTATE, POTASSIUM CHLORIDE, AND CALCIUM CHLORIDE: 600; 310; 30; 20 INJECTION, SOLUTION INTRAVENOUS at 22:35

## 2017-01-18 RX ADMIN — FENTANYL CITRATE 20 MCG: 50 INJECTION, SOLUTION INTRAMUSCULAR; INTRAVENOUS at 23:11

## 2017-01-18 RX ADMIN — FENTANYL CITRATE 48 MCG: 50 INJECTION, SOLUTION INTRAMUSCULAR; INTRAVENOUS at 23:52

## 2017-01-18 RX ADMIN — MORPHINE SULFATE 4.8 MG: 1 INJECTION, SOLUTION EPIDURAL; INTRATHECAL; INTRAVENOUS at 23:30

## 2017-01-18 RX ADMIN — KETOROLAC TROMETHAMINE 30 MG: 30 INJECTION, SOLUTION INTRAMUSCULAR; INTRAVENOUS at 23:53

## 2017-01-18 RX ADMIN — SODIUM CHLORIDE, SODIUM LACTATE, POTASSIUM CHLORIDE, CALCIUM CHLORIDE: 600; 310; 30; 20 INJECTION, SOLUTION INTRAVENOUS at 23:03

## 2017-01-18 RX ADMIN — MORPHINE SULFATE 0.2 MG: 1 INJECTION, SOLUTION EPIDURAL; INTRATHECAL; INTRAVENOUS at 23:11

## 2017-01-18 RX ADMIN — ONDANSETRON 4 MG: 2 INJECTION INTRAMUSCULAR; INTRAVENOUS at 23:54

## 2017-01-18 RX ADMIN — MORPHINE SULFATE 2.5 MG: 1 INJECTION, SOLUTION EPIDURAL; INTRATHECAL; INTRAVENOUS at 23:45

## 2017-01-18 RX ADMIN — SODIUM CITRATE AND CITRIC ACID MONOHYDRATE 30 ML: 500; 334 SOLUTION ORAL at 22:43

## 2017-01-18 RX ADMIN — CEFAZOLIN SODIUM 2 G: 2 SOLUTION INTRAVENOUS at 23:00

## 2017-01-18 RX ADMIN — DIPHENHYDRAMINE HYDROCHLORIDE 12.5 MG: 50 INJECTION, SOLUTION INTRAMUSCULAR; INTRAVENOUS at 23:56

## 2017-01-18 RX ADMIN — BUPIVACAINE HYDROCHLORIDE 1.4 ML: 7.5 INJECTION, SOLUTION INTRASPINAL at 23:11

## 2017-01-18 NOTE — IP AVS SNAPSHOT
Current Discharge Medication List  
  
Take these medications at their scheduled times Dose & Instructions Dispensing Information Comments Morning Noon Evening Bedtime NIFEdipine ER 60 mg ER tablet Commonly known as:  PROCARDIA XL Your next dose is: Today, Tomorrow Other:  ____________ Dose:  60 mg Take 1 Tab by mouth daily. Indications: Hypertension Quantity:  30 Tab Refills:  2  
     
   
   
   
  
 prenatal vit-iron fumarate-fa 28 mg iron- 800 mcg Tab Commonly known as:  PRENATAL PLUS with IRON Your next dose is: Today, Tomorrow Other:  ____________ Dose:  1 Tab Take 1 Tab by mouth daily. Indications: PREGNANCY Quantity:  30 Tab Refills:  11 Substitution allowed Take these medications as needed Dose & Instructions Dispensing Information Comments Morning Noon Evening Bedtime  
 ibuprofen 800 mg tablet Commonly known as:  MOTRIN Your next dose is: Today, Tomorrow Other:  ____________ Dose:  800 mg Take 1 Tab by mouth every eight (8) hours as needed. Quantity:  30 Tab Refills:  0  
     
   
   
   
  
 oxyCODONE-acetaminophen 5-325 mg per tablet Commonly known as:  PERCOCET Your next dose is: Today, Tomorrow Other:  ____________ Dose:  1-2 Tab Take 1-2 Tabs by mouth every four (4) hours as needed. Max Daily Amount: 12 Tabs. Quantity:  30 Tab Refills:  0 Where to Get Your Medications These medications were sent to [address] [place] [address], [facility] [address] Phone:  [phone]  
  ibuprofen 800 mg tablet NIFEdipine ER 60 mg ER tablet Information about where to get these medications is not yet available ! Ask your nurse or doctor about these medications oxyCODONE-acetaminophen 5-325 mg per tablet

## 2017-01-18 NOTE — IP AVS SNAPSHOT
Summary of Care Report The Summary of Care report has been created to help improve care coordination. Users with access to [employer] or [address] (Web-based application) may access additional patient information including the Discharge Summary. If you are not currently a [address] user and need more information, please call the number listed below in the [place] [address] section and ask to be connected with Medical Records. Facility Information Name Address Phone [facility] [address] [place] [id number] [phone] Patient Information Patient Name Sex FREDDIE Godoy ([id number]) Female 1980 Discharge Information Admitting Provider Service Area Unit Jackie Herrmann, DO / [address] Mother Baby Unit / [phone] Discharge Provider Discharge Date/Time Discharge Disposition Destination (none) 2017 (Pending) AHR (none) Patient Language Language ENGLISH [13] Problem List as of 2017  Date Reviewed: 2017 Codes Priority Class Noted - Resolved Chronic hypertension ICD-10-CM: I10 
ICD-9-CM: 401.9   2016 - Present RESOLVED: History of  delivery, currently pregnant in first trimester ICD-10-CM: O09.211 ICD-9-CM: V23.41   2016 - 2016 Adult celiac disease ICD-10-CM: K90.0 ICD-9-CM: 579.0   2016 - Present History of ectopic pregnancy ICD-10-CM: Z87.59 
ICD-9-CM: V13.29   2016 - Present Rh negative status during pregnancy in second trimester, antepartum ICD-10-CM: O09.892 ICD-9-CM: V23.89   2016 - Present High-risk pregnancy ICD-10-CM: O09.90 ICD-9-CM: V23.9   2016 - Present Hx of  delivery, currently pregnant ICD-10-CM: O09.219 ICD-9-CM: V23.41   2016 - Present Choroid plexus cyst of fetus ICD-10-CM: O35. 7UU6 ICD-9-CM: 655.00   2016 - Present Overview Signed 2016 11:30 AM by Hayden Solares DO  
  bilateral 
  
  
 H/O:  ICD-10-CM: D73.641 ICD-9-CM: V45.89   10/6/2016 - Present Overview Signed 10/6/2016  2:26 PM by Hayden Solares DO  
  x2 RESOLVED: 31 weeks gestation of pregnancy ICD-10-CM: Z3A.31 
ICD-9-CM: V22.2   2016 - 1/10/2017 Non-stress test reactive on fetal surveillance ICD-10-CM: Z36 ICD-9-CM: V28.89   2016 - Present RESOLVED: 33 weeks gestation of pregnancy ICD-10-CM: Z3A.33 
ICD-9-CM: V22.2   2016 - 1/10/2017 Premature cervical dilation in third trimester ICD-10-CM: O34.33 
ICD-9-CM: 654.53   2016 - Present Overview Signed 2016  3:27 PM by Hedy Marcus DO  
  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 
ICD-9-CM: V22.2   2017 - 1/10/2017 RESOLVED: Dehydration ICD-10-CM: E86.0 ICD-9-CM: 276.51   2017 - 1/10/2017 Asymptomatic microscopic hematuria ICD-10-CM: R31.21 
ICD-9-CM: 599.72   2017 - Present 35 weeks gestation of pregnancy ICD-10-CM: Z3A.35 
ICD-9-CM: V22.2   1/10/2017 - Present False labor before 37 completed weeks of gestation in third trimester ICD-10-CM: O47.03 
ICD-9-CM: 644.03   2017 - Present RESOLVED:  labor ICD-10-CM: O60.00 ICD-9-CM: 644.00   2017 - 2017 RESOLVED: Fetal tachycardia affecting management of mother ICD-10-CM: O76 
ICD-9-CM: 659.70   2017 - 2017 Previous  section complicating pregnancy BMW-27-PV: O34.219 ICD-9-CM: 654.20   2017 - Present Abscess of axilla, right ICD-10-CM: L02.411 ICD-9-CM: 682.3   2017 - Present  RESOLVED: Postoperative fever ICD-10-CM: R50.82 
 ICD-9-CM: 780.62   1/20/2017 - 1/21/2017 RESOLVED: 36 weeks gestation of pregnancy ICD-10-CM: Z3A.36 
ICD-9-CM: V22.2   1/20/2017 - 1/21/2017 Perineal/vulvar hematoma with delivery, postpartum condition ICD-10-CM: O71.7 ICD-9-CM: 664.54   1/20/2017 - Present You are allergic to the following Allergen Reactions Iodine Hives Nausea and Vomiting MRI DYE Current Discharge Medication List  
  
START taking these medications Dose & Instructions Dispensing Information Comments  
 ibuprofen 800 mg tablet Commonly known as:  MOTRIN Dose:  800 mg Take 1 Tab by mouth every eight (8) hours as needed. Quantity:  30 Tab Refills:  0  
   
 NIFEdipine ER 60 mg ER tablet Commonly known as:  PROCARDIA XL Dose:  60 mg Take 1 Tab by mouth daily. Indications: Hypertension Quantity:  30 Tab Refills:  2  
   
 oxyCODONE-acetaminophen 5-325 mg per tablet Commonly known as:  PERCOCET Dose:  1-2 Tab Take 1-2 Tabs by mouth every four (4) hours as needed. Max Daily Amount: 12 Tabs. Quantity:  30 Tab Refills:  0 CONTINUE these medications which have NOT CHANGED Dose & Instructions Dispensing Information Comments  
 prenatal vit-iron fumarate-fa 28 mg iron- 800 mcg Tab Commonly known as:  PRENATAL PLUS with IRON Dose:  1 Tab Take 1 Tab by mouth daily. Indications: PREGNANCY Quantity:  30 Tab Refills:  11 Substitution allowed STOP taking these medications Comments  
 labetalol 100 mg tablet Commonly known as:  NORMODYNE  
   
   
 TYLENOL 325 mg tablet Generic drug:  acetaminophen Current Immunizations Name Date Influenza Vaccine 10/6/2016 MMR  Deferred () Rho(D) Immune Globulin - IM 1/19/2017 Rho(D) Immune Globulin - IV Or IM 12/14/2016 Tdap 12/19/2016 Surgery Information ID Date/Time Status Primary Surgeon All Procedures Location [id number] 2017 Posted Kat Vargas DO  SECTION [facility] L&D OR Follow-up Information Follow up With Details Comments Contact Info None   None (395) Patient stated that they have no PCP Discharge Instructions None Chart Review Routing History No Routing History on File

## 2017-01-18 NOTE — IP AVS SNAPSHOT
[address] Patient: Jamil Gentile MRN: [id number] You are allergic to the following Allergen Reactions Iodine Hives Nausea and Vomiting MRI DYE Immunizations Administered for This Admission Name Date MMR  Deferred () Rho(D) Immune Globulin - IM 2017 Recent Documentation Height Weight Breastfeeding? BMI OB Status Smoking Status 1.575 m 86.6 kg Unknown 34.93 kg/m2 Recent pregnancy Former Smoker Unresulted Labs Order Current Status HIV 1/2 RAPID SCREEN In process CULTURE, WOUND W GRAM STAIN Preliminary result Emergency Contacts Name Discharge Info Relation Home Work Mobile Josafat Peters  Spouse [3] [phone] About your hospitalization You were admitted on:  2017 You last received care in the:  [place] [address] You were discharged on:  2017 Unit phone number:  [phone] Why you were hospitalized Your primary diagnosis was:  Not on File Your diagnoses also included:   Labor, Fetal Tachycardia Affecting Management Of Mother, Previous  Section Complicating Pregnancy, Abscess Of Axilla, Right, Postoperative Fever, 36 Weeks Gestation Of Pregnancy, Perineal/Vulvar Hematoma With Delivery, Postpartum Condition Providers Seen During Your Hospitalizations Provider Role Specialty Primary office phone Nunzio Cogan, DO Attending Provider Obstetrics & Gynecology [phone] Your Primary Care Physician (PCP) Primary Care Physician Office Phone Office Fax NONE ** None ** ** None ** Follow-up Information Follow up With Details Comments Contact Info None   None (395) Patient stated that they have no PCP Current Discharge Medication List  
  
START taking these medications Dose & Instructions Dispensing Information Comments Morning Noon Evening Bedtime  
 ibuprofen 800 mg tablet Commonly known as:  MOTRIN Your next dose is: Today, Tomorrow Other:  _________ Dose:  800 mg Take 1 Tab by mouth every eight (8) hours as needed. Quantity:  30 Tab Refills:  0  
     
   
   
   
  
 NIFEdipine ER 60 mg ER tablet Commonly known as:  PROCARDIA XL Your next dose is: Today, Tomorrow Other:  _________ Dose:  60 mg Take 1 Tab by mouth daily. Indications: Hypertension Quantity:  30 Tab Refills:  2  
     
   
   
   
  
 oxyCODONE-acetaminophen 5-325 mg per tablet Commonly known as:  PERCOCET Your next dose is: Today, Tomorrow Other:  _________ Dose:  1-2 Tab Take 1-2 Tabs by mouth every four (4) hours as needed. Max Daily Amount: 12 Tabs. Quantity:  30 Tab Refills:  0 CONTINUE these medications which have NOT CHANGED Dose & Instructions Dispensing Information Comments Morning Noon Evening Bedtime  
 prenatal vit-iron fumarate-fa 28 mg iron- 800 mcg Tab Commonly known as:  PRENATAL PLUS with IRON Your next dose is: Today, Tomorrow Other:  _________ Dose:  1 Tab Take 1 Tab by mouth daily. Indications: PREGNANCY Quantity:  30 Tab Refills:  11 Substitution allowed STOP taking these medications   
 labetalol 100 mg tablet Commonly known as:  NORMODYNE  
   
  
 TYLENOL 325 mg tablet Generic drug:  acetaminophen Where to Get Your Medications These medications were sent to [address] [place] [address], [facility] [address] Phone:  [phone]  
  ibuprofen 800 mg tablet NIFEdipine ER 60 mg ER tablet Information on where to get these meds will be given to you by the nurse or doctor. ! Ask your nurse or doctor about these medications  
  oxyCODONE-acetaminophen 5-325 mg per tablet Discharge Instructions None Discharge Orders None [employer] Announcement We are excited to announce that we are making your provider's discharge notes available to you in [employer]. You will see these notes when they are completed and signed by the physician that discharged you from your recent hospital stay. If you have any questions or concerns about any information you see in [employer], please call the Health Information Department where you were seen or reach out to your Primary Care Provider for more information about your plan of care. Introducing [facility]! Dear Scott Lundy: Thank you for requesting a [employer] account. Our records indicate that you already have an active [employer] account. You can access your account anytime at [email or web address] Did you know that you can access your hospital and ER discharge instructions at any time in [employer]? You can also review all of your test results from your hospital stay or ER visit. Additional Information If you have questions, please visit the Frequently Asked Questions section of the [employer] website at [email or web address] Remember, [employer] is NOT to be used for urgent needs. For medical emergencies, dial 911. Now available from your iPhone and Android! General Information Please provide this summary of care documentation to your next provider. Patient Signature:  ____________________________________________________________ Date:  ____________________________________________________________  
  
[facility] Provider Signature:  ____________________________________________________________ Date:  ____________________________________________________________

## 2017-01-19 PROBLEM — O60.00 PRETERM LABOR: Status: ACTIVE | Noted: 2017-01-19

## 2017-01-19 PROBLEM — O36.8390 FETAL TACHYCARDIA AFFECTING MANAGEMENT OF MOTHER: Status: ACTIVE | Noted: 2017-01-19

## 2017-01-19 PROBLEM — O34.219 PREVIOUS CESAREAN SECTION COMPLICATING PREGNANCY: Status: ACTIVE | Noted: 2017-01-19

## 2017-01-19 LAB
ABO + RH BLD: NORMAL
ANION GAP BLD CALC-SCNC: 9 MMOL/L (ref 3–18)
APPEARANCE UR: ABNORMAL
BACTERIA URNS QL MICRO: ABNORMAL /HPF
BILIRUB UR QL: ABNORMAL
BLOOD BANK CMNT PATIENT-IMP: NORMAL
BLOOD GROUP ANTIBODIES SERPL: NORMAL
BLOOD GROUP ANTIBODIES SERPL: NORMAL
BUN SERPL-MCNC: 9 MG/DL (ref 7–18)
BUN/CREAT SERPL: 16 (ref 12–20)
CALCIUM SERPL-MCNC: 8.4 MG/DL (ref 8.5–10.1)
CHLORIDE SERPL-SCNC: 103 MMOL/L (ref 100–108)
CO2 SERPL-SCNC: 25 MMOL/L (ref 21–32)
COLOR UR: YELLOW
CREAT SERPL-MCNC: 0.56 MG/DL (ref 0.6–1.3)
EPITH CASTS URNS QL MICRO: ABNORMAL /LPF (ref 0–5)
GLUCOSE SERPL-MCNC: 87 MG/DL (ref 74–99)
GLUCOSE UR STRIP.AUTO-MCNC: NEGATIVE MG/DL
HBV SURFACE AG SER QL: <0.1 INDEX
HBV SURFACE AG SER QL: NEGATIVE
HCT VFR BLD AUTO: 35.9 % (ref 35–45)
HGB BLD-MCNC: 11.9 G/DL (ref 12–16)
HGB UR QL STRIP: ABNORMAL
KETONES UR QL STRIP.AUTO: NEGATIVE MG/DL
LEUKOCYTE ESTERASE UR QL STRIP.AUTO: ABNORMAL
MUCOUS THREADS URNS QL MICRO: ABNORMAL /LPF
NITRITE UR QL STRIP.AUTO: POSITIVE
PH UR STRIP: 6.5 [PH] (ref 5–8)
POTASSIUM SERPL-SCNC: 3.9 MMOL/L (ref 3.5–5.5)
PROT UR STRIP-MCNC: 30 MG/DL
RBC #/AREA URNS HPF: ABNORMAL /HPF (ref 0–5)
RUBV IGG SER-IMP: NORMAL
SODIUM SERPL-SCNC: 137 MMOL/L (ref 136–145)
SP GR UR REFRACTOMETRY: >1.03 (ref 1–1.03)
SPECIMEN EXP DATE BLD: NORMAL
UROBILINOGEN UR QL STRIP.AUTO: 1 EU/DL (ref 0.2–1)
WBC URNS QL MICRO: ABNORMAL /HPF (ref 0–4)

## 2017-01-19 PROCEDURE — 77030020255 HC SOL INJ LR 1000ML BG

## 2017-01-19 PROCEDURE — 74011000258 HC RX REV CODE- 258

## 2017-01-19 PROCEDURE — 74011250636 HC RX REV CODE- 250/636: Performed by: OBSTETRICS & GYNECOLOGY

## 2017-01-19 PROCEDURE — 75410000002 HC LABOR FEE PER 1 HR

## 2017-01-19 PROCEDURE — 85461 HEMOGLOBIN FETAL: CPT | Performed by: OBSTETRICS & GYNECOLOGY

## 2017-01-19 PROCEDURE — 77030036554

## 2017-01-19 PROCEDURE — 74011000258 HC RX REV CODE- 258: Performed by: OBSTETRICS & GYNECOLOGY

## 2017-01-19 PROCEDURE — 76060000078 HC EPIDURAL ANESTHESIA

## 2017-01-19 PROCEDURE — 74011250636 HC RX REV CODE- 250/636: Performed by: NURSE ANESTHETIST, CERTIFIED REGISTERED

## 2017-01-19 PROCEDURE — 86900 BLOOD TYPING SEROLOGIC ABO: CPT | Performed by: OBSTETRICS & GYNECOLOGY

## 2017-01-19 PROCEDURE — 87086 URINE CULTURE/COLONY COUNT: CPT | Performed by: OBSTETRICS & GYNECOLOGY

## 2017-01-19 PROCEDURE — 74011250636 HC RX REV CODE- 250/636

## 2017-01-19 PROCEDURE — 85014 HEMATOCRIT: CPT | Performed by: OBSTETRICS & GYNECOLOGY

## 2017-01-19 PROCEDURE — 75410000003 HC RECOV DEL/VAG/CSECN EA 0.5 HR

## 2017-01-19 PROCEDURE — 3E0234Z INTRODUCTION OF SERUM, TOXOID AND VACCINE INTO MUSCLE, PERCUTANEOUS APPROACH: ICD-10-PCS | Performed by: OBSTETRICS & GYNECOLOGY

## 2017-01-19 PROCEDURE — 65270000029 HC RM PRIVATE

## 2017-01-19 PROCEDURE — 74011250637 HC RX REV CODE- 250/637: Performed by: OBSTETRICS & GYNECOLOGY

## 2017-01-19 PROCEDURE — 85018 HEMOGLOBIN: CPT | Performed by: OBSTETRICS & GYNECOLOGY

## 2017-01-19 PROCEDURE — 81001 URINALYSIS AUTO W/SCOPE: CPT | Performed by: OBSTETRICS & GYNECOLOGY

## 2017-01-19 PROCEDURE — 88307 TISSUE EXAM BY PATHOLOGIST: CPT | Performed by: OBSTETRICS & GYNECOLOGY

## 2017-01-19 PROCEDURE — 77030027138 HC INCENT SPIROMETER -A

## 2017-01-19 PROCEDURE — 80048 BASIC METABOLIC PNL TOTAL CA: CPT | Performed by: OBSTETRICS & GYNECOLOGY

## 2017-01-19 PROCEDURE — 36415 COLL VENOUS BLD VENIPUNCTURE: CPT | Performed by: OBSTETRICS & GYNECOLOGY

## 2017-01-19 RX ORDER — OXYCODONE AND ACETAMINOPHEN 5; 325 MG/1; MG/1
1-2 TABLET ORAL
Status: DISCONTINUED | OUTPATIENT
Start: 2017-01-19 | End: 2017-01-21 | Stop reason: HOSPADM

## 2017-01-19 RX ORDER — ZOLPIDEM TARTRATE 5 MG/1
5 TABLET ORAL
Status: DISCONTINUED | OUTPATIENT
Start: 2017-01-19 | End: 2017-01-21 | Stop reason: HOSPADM

## 2017-01-19 RX ORDER — IBUPROFEN 400 MG/1
800 TABLET ORAL
Status: DISCONTINUED | OUTPATIENT
Start: 2017-01-22 | End: 2017-01-19

## 2017-01-19 RX ORDER — KETOROLAC TROMETHAMINE 30 MG/ML
30 INJECTION, SOLUTION INTRAMUSCULAR; INTRAVENOUS EVERY 6 HOURS
Status: DISCONTINUED | OUTPATIENT
Start: 2017-01-19 | End: 2017-01-20

## 2017-01-19 RX ORDER — SIMETHICONE 80 MG
80 TABLET,CHEWABLE ORAL
Status: DISCONTINUED | OUTPATIENT
Start: 2017-01-19 | End: 2017-01-21 | Stop reason: HOSPADM

## 2017-01-19 RX ORDER — HYDROMORPHONE HYDROCHLORIDE 1 MG/ML
INJECTION, SOLUTION INTRAMUSCULAR; INTRAVENOUS; SUBCUTANEOUS
Status: COMPLETED
Start: 2017-01-19 | End: 2017-01-19

## 2017-01-19 RX ORDER — OXYTOCIN/RINGER'S LACTATE 20/1000 ML
125 PLASTIC BAG, INJECTION (ML) INTRAVENOUS CONTINUOUS
Status: DISCONTINUED | OUTPATIENT
Start: 2017-01-19 | End: 2017-01-21 | Stop reason: HOSPADM

## 2017-01-19 RX ORDER — SODIUM CHLORIDE, SODIUM LACTATE, POTASSIUM CHLORIDE, CALCIUM CHLORIDE 600; 310; 30; 20 MG/100ML; MG/100ML; MG/100ML; MG/100ML
125 INJECTION, SOLUTION INTRAVENOUS CONTINUOUS
Status: DISPENSED | OUTPATIENT
Start: 2017-01-19 | End: 2017-01-20

## 2017-01-19 RX ORDER — PROMETHAZINE HYDROCHLORIDE 25 MG/ML
25 INJECTION, SOLUTION INTRAMUSCULAR; INTRAVENOUS
Status: DISCONTINUED | OUTPATIENT
Start: 2017-01-19 | End: 2017-01-21 | Stop reason: HOSPADM

## 2017-01-19 RX ORDER — FACIAL-BODY WIPES
10 EACH TOPICAL
Status: DISCONTINUED | OUTPATIENT
Start: 2017-01-19 | End: 2017-01-21 | Stop reason: HOSPADM

## 2017-01-19 RX ORDER — SODIUM CHLORIDE 900 MG/100ML
INJECTION INTRAVENOUS
Status: COMPLETED
Start: 2017-01-19 | End: 2017-01-19

## 2017-01-19 RX ORDER — ACETAMINOPHEN 325 MG/1
650 TABLET ORAL
Status: DISCONTINUED | OUTPATIENT
Start: 2017-01-19 | End: 2017-01-21 | Stop reason: HOSPADM

## 2017-01-19 RX ORDER — IBUPROFEN 400 MG/1
800 TABLET ORAL
Status: DISCONTINUED | OUTPATIENT
Start: 2017-01-19 | End: 2017-01-21 | Stop reason: HOSPADM

## 2017-01-19 RX ORDER — SODIUM CHLORIDE 0.9 % (FLUSH) 0.9 %
5-10 SYRINGE (ML) INJECTION AS NEEDED
Status: DISCONTINUED | OUTPATIENT
Start: 2017-01-19 | End: 2017-01-21 | Stop reason: HOSPADM

## 2017-01-19 RX ORDER — NALBUPHINE HYDROCHLORIDE 10 MG/ML
5 INJECTION, SOLUTION INTRAMUSCULAR; INTRAVENOUS; SUBCUTANEOUS
Status: ACTIVE | OUTPATIENT
Start: 2017-01-19 | End: 2017-01-20

## 2017-01-19 RX ORDER — SODIUM CHLORIDE 0.9 % (FLUSH) 0.9 %
5-10 SYRINGE (ML) INJECTION EVERY 8 HOURS
Status: DISCONTINUED | OUTPATIENT
Start: 2017-01-19 | End: 2017-01-20

## 2017-01-19 RX ORDER — HYDROMORPHONE HYDROCHLORIDE 2 MG/ML
0.5 INJECTION, SOLUTION INTRAMUSCULAR; INTRAVENOUS; SUBCUTANEOUS
Status: DISCONTINUED | OUTPATIENT
Start: 2017-01-19 | End: 2017-01-21 | Stop reason: HOSPADM

## 2017-01-19 RX ADMIN — IBUPROFEN 800 MG: 400 TABLET ORAL at 23:44

## 2017-01-19 RX ADMIN — HYDROMORPHONE HYDROCHLORIDE 0.5 MG: 2 INJECTION INTRAMUSCULAR; INTRAVENOUS; SUBCUTANEOUS at 02:57

## 2017-01-19 RX ADMIN — OXYCODONE HYDROCHLORIDE AND ACETAMINOPHEN 1 TABLET: 5; 325 TABLET ORAL at 15:58

## 2017-01-19 RX ADMIN — KETOROLAC TROMETHAMINE 30 MG: 30 INJECTION, SOLUTION INTRAMUSCULAR at 11:29

## 2017-01-19 RX ADMIN — Medication 125 ML/HR: at 01:30

## 2017-01-19 RX ADMIN — OXYCODONE HYDROCHLORIDE AND ACETAMINOPHEN 2 TABLET: 5; 325 TABLET ORAL at 22:02

## 2017-01-19 RX ADMIN — OXYCODONE HYDROCHLORIDE AND ACETAMINOPHEN 1 TABLET: 5; 325 TABLET ORAL at 14:03

## 2017-01-19 RX ADMIN — HUMAN RHO(D) IMMUNE GLOBULIN 0.3 MG: 300 INJECTION, SOLUTION INTRAMUSCULAR at 12:00

## 2017-01-19 RX ADMIN — CEFAZOLIN SODIUM 1 G: 1 INJECTION, POWDER, FOR SOLUTION INTRAMUSCULAR; INTRAVENOUS at 22:48

## 2017-01-19 RX ADMIN — KETOROLAC TROMETHAMINE 30 MG: 30 INJECTION, SOLUTION INTRAMUSCULAR at 06:06

## 2017-01-19 RX ADMIN — CEFAZOLIN SODIUM 1 G: 1 INJECTION, POWDER, FOR SOLUTION INTRAMUSCULAR; INTRAVENOUS at 18:06

## 2017-01-19 RX ADMIN — SODIUM CHLORIDE 50 ML: 900 INJECTION INTRAVENOUS at 08:54

## 2017-01-19 RX ADMIN — VITAMIN A ACETATE, .BETA.-CAROTENE, ASCORBIC ACID, CHOLECALCIFEROL, .ALPHA.-TOCOPHEROL ACETATE, DL-, THIAMINE MONONITRATE, RIBOFLAVIN, NIACINAMIDE, PYRIDOXINE HYDROCHLORIDE, FOLIC ACID, CYANOCOBALAMIN, CALCIUM CARBONATE, FERROUS FUMARATE, ZINC OXIDE, AND CUPRIC OXIDE 1 TABLET: 2000; 2000; 120; 400; 22; 1.84; 3; 20; 10; 1; 12; 200; 27; 25; 2 TABLET ORAL at 09:17

## 2017-01-19 RX ADMIN — CEFAZOLIN SODIUM 1 G: 1 INJECTION, POWDER, FOR SOLUTION INTRAMUSCULAR; INTRAVENOUS at 08:55

## 2017-01-19 RX ADMIN — SODIUM CHLORIDE, SODIUM LACTATE, POTASSIUM CHLORIDE, AND CALCIUM CHLORIDE 500 ML: 600; 310; 30; 20 INJECTION, SOLUTION INTRAVENOUS at 08:19

## 2017-01-19 RX ADMIN — SODIUM CHLORIDE, SODIUM LACTATE, POTASSIUM CHLORIDE, AND CALCIUM CHLORIDE 125 ML/HR: 600; 310; 30; 20 INJECTION, SOLUTION INTRAVENOUS at 12:25

## 2017-01-19 RX ADMIN — HYDROMORPHONE HYDROCHLORIDE 0.5 MG: 1 INJECTION, SOLUTION INTRAMUSCULAR; INTRAVENOUS; SUBCUTANEOUS at 08:41

## 2017-01-19 RX ADMIN — HYDROMORPHONE HYDROCHLORIDE 0.5 MG: 2 INJECTION INTRAMUSCULAR; INTRAVENOUS; SUBCUTANEOUS at 09:09

## 2017-01-19 RX ADMIN — KETOROLAC TROMETHAMINE 30 MG: 30 INJECTION, SOLUTION INTRAMUSCULAR at 17:26

## 2017-01-19 NOTE — PROGRESS NOTES
Verbal report received from [address] [place] [place] [address] at bedside  using Kardex, MAR, I and O and Recent results. Care assumed.

## 2017-01-19 NOTE — PROGRESS NOTES
Mom doing well. Up to see baby again in nursery with lots of family. Pain well managed with toradol and one percocet at a time. . B/P a little high this afternoon when pain level was a 5. . Encouraged to drink a lot. Pt has not had a stool today at all. Bui out without problems. Pt instructed to let nurse know when she needs to void. IV site without redness or swelling, retaped several times because skin was moist and tape coming loose. Will continue to monitor. Urine output still low, 475 cc this shift after 500 cc bolus. Urine a little lighter. Lochia small, fundus firm.

## 2017-01-19 NOTE — PROGRESS NOTES
Pt to L&D with family and in stable condition for complaint of Uterine contractions since 1930. Pt assisted to bed and oriented to room 3414. EFM and TOCO explained and applied. Initial . Pt is a  at 36w1d. Pt states   Allergies   Allergen Reactions    Iodine Hives and Nausea and Vomiting     MRI DYE    . Pt denies leaking or gushing of fluid. Pt denies vaginal bleeding. Pt reports fetal movement. Call bell in reach, Pt encouraged to call for assistance if needed. Pt complains of pain to abdomen and back rates pain 10/10. PT states \"feel like I have to poop. \"     Pt given contraction button to push when she feels a contraction.

## 2017-01-19 NOTE — ANESTHESIA PROCEDURE NOTES
Spinal Block    Start time: 1/18/2017 11:08 PM  End time: 1/18/2017 11:12 PM  Performed by: Yeimi Joy  Authorized by: Jbo Caputo     Pre-procedure: Indications: primary anesthetic  Preanesthetic Checklist: patient identified, risks and benefits discussed, anesthesia consent, site marked, patient being monitored and timeout performed      Spinal Block:   Patient Position:  Seated  Prep Region:  Lumbar  Prep: Betadine and patient draped        Technique:  Single shot        Needle:   Needle Type:  Pencan  Needle Gauge:  24 G        Events: CSF confirmed, no blood with aspiration and no paresthesia        Assessment:    Patient tolerance:  Patient tolerated the procedure well with no immediate complications  For Vitals see nursing notes.

## 2017-01-19 NOTE — PROGRESS NOTES
0140 Angelina care given to pt. Pt mobile side to side. Gown changed. 0155 TRANSFER - OUT REPORT:    Verbal report given to BERNARDINO Potts RN (name) on Michelle Vinson  being transferred to MB (unit) for routine progression of care       Report consisted of patients Situation, Background, Assessment and   Recommendations(SBAR). Information from the following report(s) SBAR, MAR and Recent Results was reviewed with the receiving nurse. Lines:   Peripheral IV 01/18/17 Right Hand (Active)   Site Assessment Clean, dry, & intact 1/18/2017 11:58 PM   Phlebitis Assessment 0 1/18/2017 11:58 PM   Infiltration Assessment 0 1/18/2017 11:58 PM   Dressing Status Clean, dry, & intact 1/18/2017 11:58 PM   Dressing Type Transparent 1/18/2017 11:58 PM   Hub Color/Line Status Pink; Infusing 1/18/2017 11:58 PM        Opportunity for questions and clarification was provided. Patient transported with:   Registered Nurse        4299 Pt transferred from Recovery via stretcher in stable post op condition. Pt assisted to bed and oriented to room 3410. PIV patent and infusing LR with 20 units of pitocin at 125 ml/hr. Bilateral sequential compression devices in place. Pt provided PO fluids, advised to sip slowly and stop if experiencing nausea/vomiting. Pt advised of hourly rounds and vitals/fundal checks. Pt verbalized understanding. Family at bedside, call bell in reach. Pt encouraged to call for assistance if needed.

## 2017-01-19 NOTE — OP NOTES
Section Delivery Procedure Note      Name: Deshaun Ledezma   Medical Record Number: [id number]   YOB: 1980  Today's Date: 2017      Preoperative Diagnosis:  labor, non-reassuring fetal heart tones, previous  section    Postoperative Diagnosis: Same    Procedure: Low Cervical Transverse Procedure(s):   SECTION    Surgeon(s):  Kiersten Casey DO    Anesthesia: Epidural    Prophylactic Antibiotics: Ancef  EBL: 500 ml  UOP: 150 ml, clear yellow  IVF: 1600 ml LR       Fetal Description: pierre female    Birth Information:   Information for the patient's :  Marcia Schaeffer [[id number]]   One Minute Apgar: 8 (Filed from Delivery Summary)  Five  Minute Apgar: 9 (Filed from Delivery Summary)      Umbilical Cord: 3 vessels present, Cord blood sent to lab for type, Rh, and Zaira' test and cord gas collected    Placenta:  expressed    Specimens: * No specimens in log *            Complications:  none    Operative Findings: At the time of the surgery a live female delivered in vertex presentation in aLeft Occiput Anterior  with an APGAR of  8 and 9 at 1 and 5 minutes respectively. Amniotic fluid was Clear. Placenta and membranes delivered complete cord had 3 vessels. inspection of the uterus, fallopian tubes and ovaries revealed normal anatomy. Procedure Details: The patient was seen in the Holding Room. The risks, benefits, complications, treatment options, and expected outcomes were discussed with the patient. The patient concurred with the proposed plan, giving informed consent. The site of surgery properly noted/marked. The patient was taken to Operating Room , identified as Deshaun Ledezma and the procedure verified as  Delivery. A Time Out was held and the above information confirmed. The patient was taken to the operating room, where spinal anesthesia was administered and found to be adequate.  Bui catheter had been placed using sterile technique. The patient is in the supine wedged position. The patient was prepped and draped in the normal sterile fashion. The abdomen was entered using the Pfannenstiel technique. The peritoneum was entered sharply well superior to the bladder. The bladder blade was then inserted. The vesicouterine and peritoneum was identified and entered sharply with Metzenbaum scissors. The bladder flap was then created sharply and the bladder blade was reinserted. A low transverse uterine incision was made with the scalpel and extended laterally with blunt finger dissection. Amniotomy was performed and the fluid was medium amount clear. A hand was inserted in the uterus and the baby is in vertex presentation in a Left Occiput Anterior. The babys head was then delivered atraumatically. The nose, mouth and hypopharynx were suctioned. The cord was clamped and cut and the baby was handed off to the waiting  care unit staff. Cord blood and cord gas were obtained. Placenta and membranes were expressed from the uterus. The uterus was exteriorized and cleared of all clots and debris. The uterus was explored and found to be clear. The uterine incision was closed in two layers; first continuous running locked suture of  number 0 Chromic, second layer of imbricating layer of similar suture. good hemostasis was assured. The posterior cul-de-sac was irrigated with warm normal saline. Good hemostasis was again reassured and the uterus was returned to the abdomen. The anterior pelvis was irrigated with warm normal saline and good hemostasis was again reassured throughout. The rectus muscles were reapproximated in the midline with a series of figure of eight stitches with 3-0 Vicryl. The fascia was closed with 0 Vicryl suture in a running fashion. Good hemostasis was assured. The subcuticular layers were reapproximated with 3-0 Vicryl in running fashion.  The skin was closed with a 3-0 Vicryl in a subcuticular fashion. Dermabond was applied. The patient tolerated the procedure well. Sponge, lap, and needle counts were correct times three and the patient and baby were taken to recovery/postpartum room in stable condition.       Infant A Delivery Info  Delivery Date/Time: 1/18/17 @ 2326  Sex:  female    Signed: Zacarias Yates DO      January 19, 2017

## 2017-01-19 NOTE — PROGRESS NOTES
65 Dr. Diane Wilson present. SVE performed. Noted continuous Fetal tachycardia.   section called    SA & Anesthesia made aware    Nursery phoned

## 2017-01-19 NOTE — PROGRESS NOTES
Urine specimen and culture sent to lab. Bui has drained oevr 200 c since shift change and bolus of 500 cc given over 30 minutes of LR. Pain well controlled with didudid ,5 cc times two 30 minutes apart. Will continue to monitor. No fever at this time  But pulse still elevated. Pt has active bowel sounds so changed to regular diet for lunch. Talked with pt about pain management. Will plan to get mom up to nursery this afternoon. Baby A positive so Rhogam evaluation ordered per protocol. Mom using Incentive spirometer well. Verbalized understanding of need for using.

## 2017-01-19 NOTE — H&P
History & Physical    Name: Ana Saldivar MRN: [id number]  SSN: [id number]    YOB: 1980  Age: 31 y.o. Sex: female      Subjective: Patient presented complaining of regular contractions, denies leaking of fluid or vaginal bleeding, notes good fetal movement. Estimated Date of Delivery: 17  OB History      Para Term  AB TAB SAB Ectopic Multiple Living    5 3 2 1 1  0 1  2          Ms. Camryn Spain is admitted with pregnancy at 36w1d for  labor, non-reassuring fetal heart tones, and previous  section x 2.. Prenatal course was complicated by chronic hypertension. Please see prenatal records for details. 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 Social History Main Topics    Smoking status: Former Smoker    Smokeless tobacco: Not on file    Alcohol use No    Drug use: No    Sexual activity: Yes     Partners: Male     Family History   Problem Relation Age of Onset    Hypertension Mother     Diabetes Mother     Hypertension Father        Allergies   Allergen Reactions    Iodine Hives and Nausea and Vomiting     MRI DYE      Prior to Admission medications    Medication Sig Start Date End Date Taking? Authorizing Provider   acetaminophen (TYLENOL) 325 mg tablet Take 650 mg by mouth every four (4) hours as needed for Pain.  Indications: HEADACHE DISORDER Historical Provider   labetalol (NORMODYNE) 100 mg tablet Take 1 Tab by mouth two (2) times a day. 16   Sherrie Fong DO   prenatal vit-iron fumarate-fa (PRENATAL PLUS WITH IRON) 28 mg iron- 800 mcg tab Take 1 Tab by mouth daily. Indications: PREGNANCY 10/6/16   Sherrie Fong DO        Review of Systems: Pertinent items are noted in HPI. Objective:     Vitals:  Vitals:    17 2100 17 2115 17 2130 17 2145   BP: 145/90 146/82 149/83 142/80   Pulse: (!) 118 (!) 105 (!) 111 (!) 114   Resp:       Temp:       Weight:       Height:            Physical Exam:  Patient with distress. Abdomen: soft, tenderness in the lower abdomen - moderate, without guarding  Fundus: soft and non tender  Perineum: blood absent, amniotic fluid absent  Cervical Exam: 2 cm dilated    80% effaced    0 station    Presenting Part: cephalic  Cervical Position: posterior  Membranes:  Intact  Fetal Heart Rate: tachycardic to 190s, moderate variability  Contractions: every 2 minutes    Prenatal Labs:   No results found for: RUBELLAEXT, GRBSEXT, HBSAGEXT, HIVEXT, RPREXT, GONNOEXT, CHLAMEXT      Assessment/Plan:     Plan: Admit for urgent repeat  section. .  Group B Strep was not tested.     Signed By:  Anjel Saenz DO     2017

## 2017-01-19 NOTE — PROGRESS NOTES
TRANSFER - IN REPORT:    Verbal report received from Tim RN(name) on Miguel Arguello  being received from L&D PACU(unit) for routine progression of care      Report consisted of patients Situation, Background, Assessment and   Recommendations(SBAR). Information from the following report(s) SBAR, Kardex, OR Summary, Procedure Summary, Intake/Output, MAR, Recent Results and Med Rec Status was reviewed with the receiving nurse. Opportunity for questions and clarification was provided. Assessment completed upon patients arrival to unit and care assumed.

## 2017-01-19 NOTE — PROGRESS NOTES
Progress Note    Patient: Miguel Arguello MRN: [id number]  SSN: [id number]    YOB: 1980  Age: 31 y.o. Sex: female      Subjective: Patient denies nausea, states she is hungry. Denies any further diarrhea since just following delivery. PostOp Day: 1     Delivery:  delivery    The patient feels well. The patient denies emotional concerns. Pain is  moderately controlled with current medications. The baby is in SCN secondary to fever. Baby is not yet feeding. Urinary output decreased since delivery, but now beginning to . Bui catheter still in place. The patient is not yet ambulating. The patient is tolerating a clear liquid diet.     Objective:      Patient Vitals for the past 8 hrs:   BP Temp Pulse Resp SpO2   17 0546 - (!) 101.9 °F (38.8 °C) - - -   17 0545 (!) 145/99 99.9 °F (37.7 °C) (!) 115 18 -   17 0213 136/90 99.2 °F (37.3 °C) (!) 106 18 98 %   17 0145 134/81 - (!) 103 15 95 %   17 0130 126/84 - 100 21 98 %   17 0115 143/80 - 96 15 96 %   17 0100 137/74 - (!) 107 17 99 %   17 0045 (!) 128/95 - (!) 124 17 99 %   17 0030 128/79 - (!) 128 26 98 %   17 0018 - 99 °F (37.2 °C) (!) 126 17 99 %   17 0016 139/74 - - - -     General:    alert, cooperative, no distress   Lochia:  appropriate   Uterine Fundus:   firm, non-tender   Fundus Location:  0   Incision:  no significant drainage, no dehiscence, no significant erythema   DVT Evaluation:  No evidence of DVT seen on physical exam.   Lungs clear bilaterally  Heart regular rhythm, tachycardic    Lab/Data Review:  BMP: No results found for: NA, K, CL, CO2, AGAP, GLU, BUN, CREA, GFRAA, GFRNA  CBC:   Lab Results   Component Value Date/Time    WBC 14.8 (H) 2017 10:30 PM    HGB 11.9 (L) 2017 05:30 AM    HCT 35.9 2017 05:30 AM     2017 10:30 PM       Assessment:     Status post: Postpartum  delivery complicated by maternal fever, diarrhea, and oliguria. Patient with chronic HTN. Plan:     Labs pending: urine culture, stool culture, BMP  Fluid bolus given secondary to decreased urine output, good response. Chronic HTN, meds not yet restarted. Postpartum care discussed including diet, ambulation, and actvitiy restrictions. Discharge instructions and questions answered for  delivery.     Signed By: Derek Dickerson DO     2017

## 2017-01-19 NOTE — PROGRESS NOTES
Mom up in wheelchair to see baby in nursery with family. Tolerated well although was ready for pain medication when she got back to bed.

## 2017-01-19 NOTE — PROGRESS NOTES
Notified Dr Rickie Mccallum of fever 101.9 ax, 99.9 oral, pt recently drinking ice water. Also Informed Dr Rickie Mccallum of decreased urine out put, 55 ml since 0200, dark brianna in color. Dr Rickie Mccallum states she noted it, and will address it after a stat c/s she is heading into now.

## 2017-01-20 PROBLEM — Z3A.36 36 WEEKS GESTATION OF PREGNANCY: Status: ACTIVE | Noted: 2017-01-20

## 2017-01-20 PROBLEM — L02.411 ABSCESS OF AXILLA, RIGHT: Status: ACTIVE | Noted: 2017-01-20

## 2017-01-20 PROBLEM — R50.82 POSTOPERATIVE FEVER: Status: ACTIVE | Noted: 2017-01-20

## 2017-01-20 LAB
ABO + RH BLD: NORMAL
ABO + RH BLDCO: NORMAL
BASOPHILS # BLD AUTO: 0 K/UL (ref 0–0.06)
BASOPHILS # BLD: 0 % (ref 0–2)
BLD PROD TYP BPU: NORMAL
BPU ID: NORMAL
CALLED TO:,BCALL1: NORMAL
DIFFERENTIAL METHOD BLD: ABNORMAL
EOSINOPHIL # BLD: 0.2 K/UL (ref 0–0.4)
EOSINOPHIL NFR BLD: 2 % (ref 0–5)
ERYTHROCYTE [DISTWIDTH] IN BLOOD BY AUTOMATED COUNT: 13.7 % (ref 11.6–14.5)
FETAL SCREEN,FMHS: NORMAL
HCT VFR BLD AUTO: 33.8 % (ref 35–45)
HGB BLD-MCNC: 11 G/DL (ref 12–16)
LYMPHOCYTES # BLD AUTO: 16 % (ref 21–52)
LYMPHOCYTES # BLD: 1.7 K/UL (ref 0.9–3.6)
MCH RBC QN AUTO: 30.7 PG (ref 24–34)
MCHC RBC AUTO-ENTMCNC: 32.5 G/DL (ref 31–37)
MCV RBC AUTO: 94.4 FL (ref 74–97)
MONOCYTES # BLD: 1.2 K/UL (ref 0.05–1.2)
MONOCYTES NFR BLD AUTO: 11 % (ref 3–10)
NEUTS SEG # BLD: 7.9 K/UL (ref 1.8–8)
NEUTS SEG NFR BLD AUTO: 71 % (ref 40–73)
PLATELET # BLD AUTO: 178 K/UL (ref 135–420)
PMV BLD AUTO: 10.8 FL (ref 9.2–11.8)
RBC # BLD AUTO: 3.58 M/UL (ref 4.2–5.3)
STATUS OF UNIT,%ST: NORMAL
UNIT DIVISION, %UDIV: 0
WBC # BLD AUTO: 11 K/UL (ref 4.6–13.2)

## 2017-01-20 PROCEDURE — 85025 COMPLETE CBC W/AUTO DIFF WBC: CPT | Performed by: OBSTETRICS & GYNECOLOGY

## 2017-01-20 PROCEDURE — 74011250636 HC RX REV CODE- 250/636: Performed by: OBSTETRICS & GYNECOLOGY

## 2017-01-20 PROCEDURE — 74011250637 HC RX REV CODE- 250/637: Performed by: OBSTETRICS & GYNECOLOGY

## 2017-01-20 PROCEDURE — 36415 COLL VENOUS BLD VENIPUNCTURE: CPT | Performed by: OBSTETRICS & GYNECOLOGY

## 2017-01-20 PROCEDURE — 65270000029 HC RM PRIVATE

## 2017-01-20 PROCEDURE — 87070 CULTURE OTHR SPECIMN AEROBIC: CPT | Performed by: OBSTETRICS & GYNECOLOGY

## 2017-01-20 PROCEDURE — 74011000258 HC RX REV CODE- 258: Performed by: OBSTETRICS & GYNECOLOGY

## 2017-01-20 RX ORDER — LABETALOL 100 MG/1
100 TABLET, FILM COATED ORAL 2 TIMES DAILY
Status: DISCONTINUED | OUTPATIENT
Start: 2017-01-20 | End: 2017-01-21

## 2017-01-20 RX ADMIN — LABETALOL HYDROCHLORIDE 100 MG: 100 TABLET, FILM COATED ORAL at 18:59

## 2017-01-20 RX ADMIN — CEFAZOLIN SODIUM 1 G: 1 INJECTION, POWDER, FOR SOLUTION INTRAMUSCULAR; INTRAVENOUS at 06:04

## 2017-01-20 RX ADMIN — IBUPROFEN 800 MG: 400 TABLET ORAL at 16:05

## 2017-01-20 RX ADMIN — OXYCODONE HYDROCHLORIDE AND ACETAMINOPHEN 2 TABLET: 5; 325 TABLET ORAL at 07:41

## 2017-01-20 RX ADMIN — CEFAZOLIN SODIUM 1 G: 1 INJECTION, POWDER, FOR SOLUTION INTRAMUSCULAR; INTRAVENOUS at 23:04

## 2017-01-20 RX ADMIN — Medication 10 ML: at 08:29

## 2017-01-20 RX ADMIN — OXYCODONE HYDROCHLORIDE AND ACETAMINOPHEN 2 TABLET: 5; 325 TABLET ORAL at 19:00

## 2017-01-20 RX ADMIN — CEFAZOLIN SODIUM 1 G: 1 INJECTION, POWDER, FOR SOLUTION INTRAMUSCULAR; INTRAVENOUS at 16:02

## 2017-01-20 RX ADMIN — Medication 10 ML: at 16:03

## 2017-01-20 RX ADMIN — IBUPROFEN 800 MG: 400 TABLET ORAL at 07:41

## 2017-01-20 RX ADMIN — BISACODYL 10 MG: 10 SUPPOSITORY RECTAL at 15:00

## 2017-01-20 RX ADMIN — VITAMIN A ACETATE, .BETA.-CAROTENE, ASCORBIC ACID, CHOLECALCIFEROL, .ALPHA.-TOCOPHEROL ACETATE, DL-, THIAMINE MONONITRATE, RIBOFLAVIN, NIACINAMIDE, PYRIDOXINE HYDROCHLORIDE, FOLIC ACID, CYANOCOBALAMIN, CALCIUM CARBONATE, FERROUS FUMARATE, ZINC OXIDE, AND CUPRIC OXIDE 1 TABLET: 2000; 2000; 120; 400; 22; 1.84; 3; 20; 10; 1; 12; 200; 27; 25; 2 TABLET ORAL at 09:59

## 2017-01-20 RX ADMIN — LABETALOL HYDROCHLORIDE 100 MG: 100 TABLET, FILM COATED ORAL at 09:59

## 2017-01-20 RX ADMIN — OXYCODONE HYDROCHLORIDE AND ACETAMINOPHEN 2 TABLET: 5; 325 TABLET ORAL at 02:48

## 2017-01-20 RX ADMIN — OXYCODONE HYDROCHLORIDE AND ACETAMINOPHEN 2 TABLET: 5; 325 TABLET ORAL at 13:20

## 2017-01-20 RX ADMIN — OXYCODONE HYDROCHLORIDE AND ACETAMINOPHEN 2 TABLET: 5; 325 TABLET ORAL at 23:03

## 2017-01-20 NOTE — PROGRESS NOTES
Pt has a large grape-sized abscess on her right axilla which she is currently draining with a warm compress. Odorous white purulent discharge coming from abscess. Per pt, abscess had been larger, about the size of a golf ball, before it came to a head and popped. When asked pt if OB was aware of abscess, pt stated \"Dr Myla Hernandez knows about it\"  Pt advised to wash hands often, especially after touching the abscess so as not to spread it to other parts of her body or to anyone else.

## 2017-01-20 NOTE — ANESTHESIA POSTPROCEDURE EVALUATION
Post-Anesthesia Evaluation and Assessment    Patient: Sb Dawn MRN: [id number]  SSN: [id number]    YOB: 1980  Age: 31 y.o. Sex: female      Data from PACU flowsheet    Cardiovascular Function/Vital Signs  Visit Vitals    BP (!) 151/96    Pulse 84    Temp 37.1 °C (98.8 °F)    Resp 18    Ht 5' 2\" (1.575 m)    Wt 86.6 kg (191 lb)    SpO2 99%    Breastfeeding Unknown    BMI 34.93 kg/m2       Patient is status post anesthesia    Nausea/Vomiting: controlled    Postoperative hydration reviewed and adequate. Pain:  Managed    Neurological Status: At baseline    Mental Status and Level of Consciousness: Alert and oriented     Pulmonary Status:   Adequate oxygenation and airway patent    Complications related to anesthesia: None    Post-anesthesia assessment completed.  No concerns    Signed By: Kaykay Kim CRNA     January 20, 2017

## 2017-01-20 NOTE — PROGRESS NOTES
Visited mother. Acquired permission to announce baby.     Carlene Sánchez   Spiritual Care   [phone]

## 2017-01-20 NOTE — PROGRESS NOTES
Progress Note    Patient: Maria De Jesus Lopez MRN: [id number]  SSN: [id number]    YOB: 1980  Age: 31 y.o. Sex: female      Subjective:     Postpartum Day: 2     Delivery:  delivery    Patient reports axillary cyst started draining overnight. Tolerating diet. No flatus. Pain controlled. No CP/SOB/dizziness. Objective:      Patient Vitals for the past 24 hrs:   Temp Pulse Resp BP SpO2   17 0020 98.8 °F (37.1 °C) 94 18 138/84 99 %   17 2224 - - - 132/85 -   17 2202 98.7 °F (37.1 °C) 95 18 (!) 150/98 98 %   17 1732 - - - (!) 138/95 -   17 1613 98.3 °F (36.8 °C) (!) 115 16 (!) 150/100 99 %   17 1151 98.9 °F (37.2 °C) (!) 117 16 125/81 96 %     tmax 101.9 yesterday ~0530      Gen: NAD     Axilla: Right, 2 cm fluctuant cyst, draining gray-seropurulent fluid, with surrounding induration. nontender. CV: RRR   CHEST: Normal effort without use of accessory muscles. CTAB   Abdomen: Normoactive BS, distended, soft, NT   Uterine Fundus:   firm at umbilicus, nontender. Incision:  Clean/Dry/Intact, ecchymosis over mons pubis with associated firm induration measuring 5x3 cm. Mildly tender to palpation. Extremities: No LE edema     Lab/Data Review: All lab results for the last 24 hours reviewed. Assessment:     POD 2 S/P LTCS   Chronic hypertension I10    Abscess of axilla, right L02.411    Postoperative fever R50.82    Perineal/vulvar hematoma with delivery, postpartum condition O71.7       Plan:   Resume labetalol. Ice over mons. Wound culture sent. Continue antibiotics for another 24 hrs, adjust accordingly. Postpartum care discussed including diet, ambulation, and actvitiy restrictions. Discharge instructions and questions answered for  delivery.     Signed By: Helaine Meigs, DO     2017

## 2017-01-20 NOTE — ROUTINE PROCESS
Verbal shift change report given to Vicki Burns RN (oncoming nurse) by Otilia Bajwa RN (offgoing nurse). Report included the following information Kardex, Intake/Output, MAR and Recent Results. 0845--assessment done--voiding without difficulty--denies weakness when up--tolerating diet, but is not passing flatus--increasing ambulation encouraged--discussed care options--will let me know if any intervention is needed--incision healing well--discussed medication options--verbalizes understanding--po fluids encouraged--dressing on draining abscess in right arm pit--saturated--removed--Dr Valdivia in to examin the area--manipulated the site and a small amount of drainage expressed--culture taken--Dr Valdivia instructed the patient to apply warm compresses to the site throughout the day--will apply dressing when she goes to the Nursery--patient verbalizes understanding. 0945--site under right arm dressed--patient ambulated to the Nursery to see her baby--linens changed--will shower when she return. 1500--patient very uncomfortable with \"gas\"--continues to not pass flatus--offered Dulcolax Suppository--1 suppository inserted--advised to allow the suppository to melt before getting out of bed--verbalizes understanding--IV pulled out--site d/c'd  1515--IV restarted--family visiting. 1845--afebrile--B/P's fluctuating-Labetalol restarted today--voiding qs--passing flatus--effective pain management with Ibuprofen and Percocet.

## 2017-01-20 NOTE — ROUTINE PROCESS
Verbal shift change report given to Jac Hunt RN (oncoming nurse) by Rony Mar RN (offgoing nurse). Report included the following information SBAR, Kardex, Intake/Output, MAR and Recent Results.

## 2017-01-21 VITALS
DIASTOLIC BLOOD PRESSURE: 89 MMHG | HEART RATE: 109 BPM | SYSTOLIC BLOOD PRESSURE: 133 MMHG | WEIGHT: 191 LBS | BODY MASS INDEX: 35.15 KG/M2 | HEIGHT: 62 IN | OXYGEN SATURATION: 98 % | TEMPERATURE: 98.2 F | RESPIRATION RATE: 16 BRPM

## 2017-01-21 PROBLEM — R50.82 POSTOPERATIVE FEVER: Status: RESOLVED | Noted: 2017-01-20 | Resolved: 2017-01-21

## 2017-01-21 PROBLEM — Z3A.36 36 WEEKS GESTATION OF PREGNANCY: Status: RESOLVED | Noted: 2017-01-20 | Resolved: 2017-01-21

## 2017-01-21 PROBLEM — O60.00 PRETERM LABOR: Status: RESOLVED | Noted: 2017-01-19 | Resolved: 2017-01-21

## 2017-01-21 PROBLEM — O36.8390 FETAL TACHYCARDIA AFFECTING MANAGEMENT OF MOTHER: Status: RESOLVED | Noted: 2017-01-19 | Resolved: 2017-01-21

## 2017-01-21 LAB
BACTERIA SPEC CULT: NORMAL
SERVICE CMNT-IMP: NORMAL

## 2017-01-21 PROCEDURE — 74011250636 HC RX REV CODE- 250/636: Performed by: OBSTETRICS & GYNECOLOGY

## 2017-01-21 PROCEDURE — 74011250637 HC RX REV CODE- 250/637: Performed by: OBSTETRICS & GYNECOLOGY

## 2017-01-21 PROCEDURE — 74011000258 HC RX REV CODE- 258: Performed by: OBSTETRICS & GYNECOLOGY

## 2017-01-21 PROCEDURE — 77010026065 HC OXYGEN MINIMUM MEDICAL AIR

## 2017-01-21 RX ORDER — OXYCODONE AND ACETAMINOPHEN 5; 325 MG/1; MG/1
1-2 TABLET ORAL
Qty: 30 TAB | Refills: 0 | Status: SHIPPED | OUTPATIENT
Start: 2017-01-21 | End: 2017-01-31 | Stop reason: SDUPTHER

## 2017-01-21 RX ORDER — IBUPROFEN 800 MG/1
800 TABLET ORAL
Qty: 30 TAB | Refills: 0 | Status: SHIPPED | OUTPATIENT
Start: 2017-01-21 | End: 2017-01-31 | Stop reason: SDUPTHER

## 2017-01-21 RX ORDER — NIFEDIPINE 60 MG/1
60 TABLET, EXTENDED RELEASE ORAL DAILY
Qty: 30 TAB | Refills: 2 | Status: SHIPPED | OUTPATIENT
Start: 2017-01-21 | End: 2020-08-22

## 2017-01-21 RX ORDER — NIFEDIPINE 30 MG/1
60 TABLET, EXTENDED RELEASE ORAL DAILY
Status: DISCONTINUED | OUTPATIENT
Start: 2017-01-21 | End: 2017-01-21 | Stop reason: HOSPADM

## 2017-01-21 RX ADMIN — VITAMIN A ACETATE, .BETA.-CAROTENE, ASCORBIC ACID, CHOLECALCIFEROL, .ALPHA.-TOCOPHEROL ACETATE, DL-, THIAMINE MONONITRATE, RIBOFLAVIN, NIACINAMIDE, PYRIDOXINE HYDROCHLORIDE, FOLIC ACID, CYANOCOBALAMIN, CALCIUM CARBONATE, FERROUS FUMARATE, ZINC OXIDE, AND CUPRIC OXIDE 1 TABLET: 2000; 2000; 120; 400; 22; 1.84; 3; 20; 10; 1; 12; 200; 27; 25; 2 TABLET ORAL at 08:41

## 2017-01-21 RX ADMIN — NIFEDIPINE 60 MG: 30 TABLET, FILM COATED, EXTENDED RELEASE ORAL at 11:54

## 2017-01-21 RX ADMIN — IBUPROFEN 800 MG: 400 TABLET ORAL at 08:42

## 2017-01-21 RX ADMIN — OXYCODONE HYDROCHLORIDE AND ACETAMINOPHEN 2 TABLET: 5; 325 TABLET ORAL at 07:32

## 2017-01-21 RX ADMIN — IBUPROFEN 800 MG: 400 TABLET ORAL at 00:03

## 2017-01-21 RX ADMIN — IBUPROFEN 800 MG: 400 TABLET ORAL at 17:13

## 2017-01-21 RX ADMIN — LABETALOL HYDROCHLORIDE 100 MG: 100 TABLET, FILM COATED ORAL at 08:42

## 2017-01-21 RX ADMIN — OXYCODONE HYDROCHLORIDE AND ACETAMINOPHEN 2 TABLET: 5; 325 TABLET ORAL at 17:13

## 2017-01-21 RX ADMIN — OXYCODONE HYDROCHLORIDE AND ACETAMINOPHEN 2 TABLET: 5; 325 TABLET ORAL at 02:50

## 2017-01-21 RX ADMIN — OXYCODONE HYDROCHLORIDE AND ACETAMINOPHEN 2 TABLET: 5; 325 TABLET ORAL at 13:16

## 2017-01-21 RX ADMIN — CEFAZOLIN SODIUM 1 G: 1 INJECTION, POWDER, FOR SOLUTION INTRAMUSCULAR; INTRAVENOUS at 07:32

## 2017-01-21 NOTE — ROUTINE PROCESS
Bedside and Verbal shift change report given to Nilda Lawrence (oncoming nurse) by Bree Beck (offgoing nurse). Report included the following information SBAR, Kardex, Procedure Summary, Intake/Output, MAR and Recent Results.

## 2017-01-21 NOTE — PROGRESS NOTES
Bedside and Verbal shift change report given to Bree Beck RN  (oncoming nurse) by Reg Andino RN (offgoing nurse). Report included the following information SBAR, Kardex, Intake/Output, MAR, Recent Results and Med Rec Status.

## 2017-01-21 NOTE — DISCHARGE SUMMARY
Obstetrical Discharge Summary       [address] [facility]  [address] [address]              Patient ID:  Tonya Webster  [id number]  6 y.o.  1980    Admit Date: 2017    Discharge Date: 2017     Admitting Physician: Lendell Curling, DO     Admission Diagnoses:  section; Fetal tachycardia affecting management of *    Discharge Diagnoses: same as above      Additional Diagnoses: none        Hospital Course: Unremarkable. She had a postpartum fever due to an axillary abscess. She was treated with additional antibiotics and has been afebrile for 24 hours. She is tolerating a full diet, ambulating, has well controlled pain. She has chronic hypertension and has been restarted on Procardia. She is asymptomatic. Information for the patient's :  Germania Damon [[id number]]   One Minute Apgar: 8 (Filed from Delivery Summary)  Five  Minute Apgar: 9 (Filed from Delivery Summary)      Immunizations:    Immunization History   Administered Date(s) Administered    Influenza Vaccine 10/06/2016    Rho(D) Immune Globulin - IM 2017    Rho(D) Immune Globulin - IV Or IM 2016    Tdap 2016       Group Beta Strep: No results found for: GRBSEXT     Visit Vitals    BP (!) 152/95    Pulse 91    Temp 98.1 °F (36.7 °C)    Resp 18    Ht 5' 2\" (1.575 m)    Wt 191 lb (86.6 kg)    LMP 05/10/2016    SpO2 98%    Breastfeeding Unknown    BMI 34.93 kg/m2       Vital signs stable, afebrile. Exam:  Patient without distress. Abdomen soft, fundus firm at level of umbilicus, non tender               Perineum with normal lochia noted. Lower extremities are negative for swelling, cords or tenderness.     Patient Instructions:   Current Discharge Medication List      START taking these medications    Details   ibuprofen (MOTRIN) 800 mg tablet Take 1 Tab by mouth every eight (8) hours as needed. Qty: 30 Tab, Refills: 0      NIFEdipine ER (PROCARDIA XL) 60 mg ER tablet Take 1 Tab by mouth daily. Indications: Hypertension  Qty: 30 Tab, Refills: 2      oxyCODONE-acetaminophen (PERCOCET) 5-325 mg per tablet Take 1-2 Tabs by mouth every four (4) hours as needed. Max Daily Amount: 12 Tabs. Qty: 30 Tab, Refills: 0         CONTINUE these medications which have NOT CHANGED    Details   prenatal vit-iron fumarate-fa (PRENATAL PLUS WITH IRON) 28 mg iron- 800 mcg tab Take 1 Tab by mouth daily. Indications: PREGNANCY  Qty: 30 Tab, Refills: 11    Comments: Substitution allowed  Associated Diagnoses: Prenatal care, subsequent pregnancy, second trimester         STOP taking these medications       acetaminophen (TYLENOL) 325 mg tablet Comments:   Reason for Stopping:         labetalol (NORMODYNE) 100 mg tablet Comments:   Reason for Stopping:               See discharge instructions provided by nursing. Follow-up in 2 weeks.     Signed:  Aakash Bain MD  1/21/2017  10:31 AM

## 2017-01-21 NOTE — PROGRESS NOTES
Progress Note    Patient: Jaja Esqueda MRN: [id number]  SSN: [id number]    YOB: 1980  Age: 31 y.o. Sex: female      Subjective:     Postpartum Day: 3     Delivery:  delivery    Pt denies complaints. She states her pain and bleeding are minimal. She is tolerating a full diet,  Ambulating and has controlled pain. She states her axillary abscess has improved. She notes no further  Drainage. Objective:      Patient Vitals for the past 8 hrs:   BP Temp Pulse Resp SpO2   17 0841 (!) 152/95 - 91 - -   17 0759 (!) 154/106 98.1 °F (36.7 °C) 82 18 98 %     Insert Hgb Here    CV:        CHEST:        Uterine Fundus:   firm       Incision:  no significant drainage, no dehiscence, no significant erythema         Lab/Data Review: All lab results for the last 24 hours reviewed. Assessment:     Status post: Doing well postpartum  delivery   Chronic hypertension- not controlled with Labetalol. Pt. Asymptomatic and not breatfeeding. Axillary abscess- improving  Plan:     Postpartum care discussed including diet, ambulation, and actvitiy restrictions. Discharge instructions and questions answered for  delivery. Start Procardia 60 now. Home with antibiotics for 7 days. RTO 2 weeks.       Signed By: Saritha Nicole MD     2017

## 2017-01-21 NOTE — PROGRESS NOTES
Patient vital signs and assessment WNL. Ambulating well in room. Voiding without difficulty. Pain managed well with motrin and percocet. Up to shower today. Has ice pack to incision. Plan is to discharge after dinner and remain guest in room since baby has not been discharged from [facility].

## 2017-01-21 NOTE — PROGRESS NOTES
Patient officially discharged but will remain as guest in room. Understands no nursing care will be provided to her after this time. Baby is SCN.

## 2017-01-21 NOTE — PROGRESS NOTES
Mother doing well and up without complications. Voiding without difficulties and pain managed with motrin and percocet. C/O increasing pain at incision at 0500. Meds not due at that time. Offered ice pack and managing well. Axillary cyst assessed at 0015. 4x4 dressing with old drainage removed. No drainage noted. Dressed again with new 4x4.

## 2017-01-21 NOTE — PROGRESS NOTES
Discharge instructions discussed with patient. Questions answered. Patient verbalizes understanding. Prescriptions given for Percocet, other prescriptions sent to [facility]. Discussed plan of care with patient. Patient will discharge this afternoon but will be able to stay as guest. Discussed with patient that after discharge no nursing care or medications will be provided. She verbalized understanding and is sending a family member to  RX. Discharge paperwork given to patient.

## 2017-01-24 DIAGNOSIS — L02.411 ABSCESS OF AXILLA, RIGHT: Primary | ICD-10-CM

## 2017-01-24 LAB
BACTERIA SPEC CULT: NORMAL
BACTERIA SPEC CULT: NORMAL
GRAM STN SPEC: NORMAL
GRAM STN SPEC: NORMAL
SERVICE CMNT-IMP: NORMAL

## 2017-01-24 RX ORDER — METRONIDAZOLE 500 MG/1
500 TABLET ORAL 2 TIMES DAILY
Qty: 14 TAB | Refills: 0 | Status: SHIPPED | OUTPATIENT
Start: 2017-01-24 | End: 2017-01-31

## 2017-01-25 NOTE — PROGRESS NOTES
Patient notified and voices understanding:  Dr. Urbano Beauchamp sent flagyl to pharmacy on file. If abscess persist schedule follow up in 7-10 days.  Otherwise, keep routine postpartum visit.

## 2017-01-31 ENCOUNTER — ROUTINE PRENATAL (OUTPATIENT)
Dept: OBGYN CLINIC | Age: 37
End: 2017-01-31

## 2017-01-31 VITALS
DIASTOLIC BLOOD PRESSURE: 86 MMHG | SYSTOLIC BLOOD PRESSURE: 137 MMHG | HEIGHT: 62 IN | WEIGHT: 178 LBS | RESPIRATION RATE: 18 BRPM | BODY MASS INDEX: 32.76 KG/M2 | HEART RATE: 95 BPM

## 2017-01-31 RX ORDER — OXYCODONE AND ACETAMINOPHEN 5; 325 MG/1; MG/1
1-2 TABLET ORAL
Qty: 30 TAB | Refills: 0 | Status: SHIPPED | OUTPATIENT
Start: 2017-01-31 | End: 2020-08-22

## 2017-01-31 RX ORDER — IBUPROFEN 800 MG/1
800 TABLET ORAL
Qty: 30 TAB | Refills: 0 | Status: SHIPPED | OUTPATIENT
Start: 2017-01-31 | End: 2020-08-22

## 2017-01-31 NOTE — PROGRESS NOTES
Subjective:      Patient is now 2 weeks post-op from a repeat c section. She complains of continued incisional pain and requests additional pain medication. She says that her vaginal bleeding is very light. She is bottle feeding. Current Outpatient Prescriptions   Medication Sig Dispense Refill    oxyCODONE-acetaminophen (PERCOCET) 5-325 mg per tablet Take 1-2 Tabs by mouth every four (4) hours as needed. Max Daily Amount: 12 Tabs. 30 Tab 0    ibuprofen (MOTRIN) 800 mg tablet Take 1 Tab by mouth every eight (8) hours as needed. 30 Tab 0    metroNIDAZOLE (FLAGYL) 500 mg tablet Take 1 Tab by mouth two (2) times a day for 7 days. Indications: SKIN AND SKIN STRUCTURE PEPTOSTREPTOCOCCUS INFECTION 14 Tab 0    NIFEdipine ER (PROCARDIA XL) 60 mg ER tablet Take 1 Tab by mouth daily. Indications: Hypertension 30 Tab 2    prenatal vit-iron fumarate-fa (PRENATAL PLUS WITH IRON) 28 mg iron- 800 mcg tab Take 1 Tab by mouth daily.  Indications: PREGNANCY 30 Tab 11     Allergies   Allergen Reactions    Iodine Hives and Nausea and Vomiting     MRI DYE      Past Medical History   Diagnosis Date    Abnormal Papanicolaou smear of cervix     Abscess of axilla, right 2017     +culture, peptostreptococcus    Adult celiac disease 2016    GBS carrier 2017    H/O:  10/6/2016    History of ectopic pregnancy 2016    History of  delivery, currently pregnant in first trimester 2016    Hypertension     Rh negative status during pregnancy in second trimester, antepartum 2016     Past Surgical History   Procedure Laterality Date    Hx  section       TIMES 2    Hx salpingo-oophorectomy Left 2015     for ruptured ectopic; laparoscopic    Pr  delivery only       Family History   Problem Relation Age of Onset    Hypertension Mother     Diabetes Mother     Hypertension Father      Social History   Substance Use Topics    Smoking status: Former Smoker    Smokeless tobacco: Not on file    Alcohol use No      Review of Systems    Pertinent items are noted in HPI. Objective:     Visit Vitals    /86    Pulse 95    Resp 18    Ht 5' 2\" (1.575 m)    Wt 178 lb (80.7 kg)    Breastfeeding No    BMI 32.56 kg/m2     General appearance: alert, cooperative, no distress, appears stated age  Abdomen: soft, non-tender. No masses,  no organomegaly, incision healing well with one 2 cm central area with an exposed skin edge and granulation tissue present. No induration, erythema, or drainage. Fundus firm and at approx. 16 cm. Assessment/Plan:     Normal incisional check post C section. Rx Percocet and Motrin. Patient to follow up 4 weeks for full postpartum visit. Plan of care discussed. Patient expressed understanding.

## 2020-08-22 ENCOUNTER — APPOINTMENT (OUTPATIENT)
Dept: GENERAL RADIOLOGY | Age: 40
End: 2020-08-22
Attending: EMERGENCY MEDICINE
Payer: MEDICAID

## 2020-08-22 ENCOUNTER — HOSPITAL ENCOUNTER (EMERGENCY)
Age: 40
Discharge: HOME | End: 2020-08-23
Attending: EMERGENCY MEDICINE
Payer: MEDICAID

## 2020-08-22 DIAGNOSIS — K85.90 ACUTE PANCREATITIS WITHOUT INFECTION OR NECROSIS, UNSPECIFIED PANCREATITIS TYPE: Primary | ICD-10-CM

## 2020-08-22 LAB
ALBUMIN SERPL-MCNC: 3.8 G/DL (ref 3.4–5)
ALBUMIN/GLOB SERPL: 0.9 {RATIO} (ref 0.8–1.7)
ALP SERPL-CCNC: 95 U/L (ref 45–117)
ALT SERPL-CCNC: 40 U/L (ref 13–56)
ANION GAP SERPL CALC-SCNC: 6 MMOL/L (ref 3–18)
AST SERPL-CCNC: 65 U/L (ref 10–38)
BASOPHILS # BLD: 0 K/UL (ref 0–0.1)
BASOPHILS NFR BLD: 0 % (ref 0–2)
BILIRUB SERPL-MCNC: 0.6 MG/DL (ref 0.2–1)
BUN SERPL-MCNC: 13 MG/DL (ref 7–18)
BUN/CREAT SERPL: 17 (ref 12–20)
CALCIUM SERPL-MCNC: 8.7 MG/DL (ref 8.5–10.1)
CHLORIDE SERPL-SCNC: 108 MMOL/L (ref 100–111)
CO2 SERPL-SCNC: 22 MMOL/L (ref 21–32)
CREAT SERPL-MCNC: 0.76 MG/DL (ref 0.6–1.3)
DIFFERENTIAL METHOD BLD: ABNORMAL
EOSINOPHIL # BLD: 0.2 K/UL (ref 0–0.4)
EOSINOPHIL NFR BLD: 2 % (ref 0–5)
ERYTHROCYTE [DISTWIDTH] IN BLOOD BY AUTOMATED COUNT: 16.2 % (ref 11.6–14.5)
GLOBULIN SER CALC-MCNC: 4.4 G/DL (ref 2–4)
GLUCOSE SERPL-MCNC: 108 MG/DL (ref 74–99)
HCT VFR BLD AUTO: 36 % (ref 35–45)
HGB BLD-MCNC: 11.5 G/DL (ref 12–16)
LIPASE SERPL-CCNC: 2842 U/L (ref 73–393)
LYMPHOCYTES # BLD: 2.1 K/UL (ref 0.9–3.6)
LYMPHOCYTES NFR BLD: 20 % (ref 21–52)
MCH RBC QN AUTO: 27.8 PG (ref 24–34)
MCHC RBC AUTO-ENTMCNC: 31.9 G/DL (ref 31–37)
MCV RBC AUTO: 87 FL (ref 74–97)
MONOCYTES # BLD: 0.8 K/UL (ref 0.05–1.2)
MONOCYTES NFR BLD: 7 % (ref 3–10)
NEUTS SEG # BLD: 7.8 K/UL (ref 1.8–8)
NEUTS SEG NFR BLD: 71 % (ref 40–73)
PLATELET # BLD AUTO: 304 K/UL (ref 135–420)
PMV BLD AUTO: 11 FL (ref 9.2–11.8)
POTASSIUM SERPL-SCNC: 4 MMOL/L (ref 3.5–5.5)
PROT SERPL-MCNC: 8.2 G/DL (ref 6.4–8.2)
RBC # BLD AUTO: 4.14 M/UL (ref 4.2–5.3)
SODIUM SERPL-SCNC: 136 MMOL/L (ref 136–145)
TROPONIN I SERPL-MCNC: 0.02 NG/ML (ref 0–0.04)
WBC # BLD AUTO: 10.9 K/UL (ref 4.6–13.2)

## 2020-08-22 PROCEDURE — 74011250636 HC RX REV CODE- 250/636: Performed by: EMERGENCY MEDICINE

## 2020-08-22 PROCEDURE — 93005 ELECTROCARDIOGRAM TRACING: CPT

## 2020-08-22 PROCEDURE — 71045 X-RAY EXAM CHEST 1 VIEW: CPT

## 2020-08-22 PROCEDURE — 83690 ASSAY OF LIPASE: CPT

## 2020-08-22 PROCEDURE — 99285 EMERGENCY DEPT VISIT HI MDM: CPT

## 2020-08-22 PROCEDURE — 85025 COMPLETE CBC W/AUTO DIFF WBC: CPT

## 2020-08-22 PROCEDURE — 84484 ASSAY OF TROPONIN QUANT: CPT

## 2020-08-22 PROCEDURE — 80053 COMPREHEN METABOLIC PANEL: CPT

## 2020-08-22 PROCEDURE — 96374 THER/PROPH/DIAG INJ IV PUSH: CPT

## 2020-08-22 RX ORDER — MAG HYDROX/ALUMINUM HYD/SIMETH 200-200-20
30 SUSPENSION, ORAL (FINAL DOSE FORM) ORAL
Status: DISCONTINUED | OUTPATIENT
Start: 2020-08-22 | End: 2020-08-22

## 2020-08-22 RX ORDER — HYDROCODONE BITARTRATE AND ACETAMINOPHEN 5; 325 MG/1; MG/1
1 TABLET ORAL
Qty: 8 TAB | Refills: 0 | Status: SHIPPED | OUTPATIENT
Start: 2020-08-22 | End: 2020-08-27

## 2020-08-22 RX ORDER — KETOROLAC TROMETHAMINE 30 MG/ML
30 INJECTION, SOLUTION INTRAMUSCULAR; INTRAVENOUS
Status: COMPLETED | OUTPATIENT
Start: 2020-08-22 | End: 2020-08-22

## 2020-08-22 RX ORDER — AMLODIPINE BESYLATE 5 MG/1
5 TABLET ORAL DAILY
COMMUNITY
Start: 2020-07-17

## 2020-08-22 RX ADMIN — KETOROLAC TROMETHAMINE 30 MG: 30 INJECTION, SOLUTION INTRAMUSCULAR at 23:29

## 2020-08-22 RX ADMIN — SODIUM CHLORIDE 1000 ML: 900 INJECTION, SOLUTION INTRAVENOUS at 22:17

## 2020-08-22 RX ADMIN — SODIUM CHLORIDE 1000 ML: 900 INJECTION, SOLUTION INTRAVENOUS at 23:30

## 2020-08-23 VITALS
DIASTOLIC BLOOD PRESSURE: 83 MMHG | BODY MASS INDEX: 31.28 KG/M2 | WEIGHT: 170 LBS | TEMPERATURE: 98.6 F | OXYGEN SATURATION: 100 % | RESPIRATION RATE: 17 BRPM | HEART RATE: 89 BPM | SYSTOLIC BLOOD PRESSURE: 148 MMHG | HEIGHT: 62 IN

## 2020-08-23 PROCEDURE — 74011250637 HC RX REV CODE- 250/637: Performed by: EMERGENCY MEDICINE

## 2020-08-23 RX ORDER — HYDROCODONE BITARTRATE AND ACETAMINOPHEN 5; 325 MG/1; MG/1
1 TABLET ORAL
Status: COMPLETED | OUTPATIENT
Start: 2020-08-23 | End: 2020-08-23

## 2020-08-23 RX ADMIN — HYDROCODONE BITARTRATE AND ACETAMINOPHEN 1 TABLET: 5; 325 TABLET ORAL at 01:30

## 2020-08-23 NOTE — DISCHARGE INSTRUCTIONS
Patient Education        Pancreatitis: Care Instructions  Your Care Instructions     The pancreas is an organ behind the stomach. It makes hormones and enzymes to help your body digest food. But if these enzymes attack the pancreas, it can get inflamed. This is called pancreatitis. Most cases are caused by gallstones or by heavy alcohol use. If you take care of yourself at home, it will help you get better. It will also help you avoid more problems with your pancreas. Follow-up care is a key part of your treatment and safety. Be sure to make and go to all appointments, and call your doctor if you are having problems. It's also a good idea to know your test results and keep a list of the medicines you take. How can you care for yourself at home? · Drink clear liquids and eat bland foods until you feel better. [place] foods include rice, dry toast, and crackers. They also include bananas and applesauce. · Eat a low-fat diet until your doctor says your pancreas is healed. · Do not drink alcohol. Tell your doctor if you need help to quit. Counseling, support groups, and sometimes medicines can help you stay sober. · Be safe with medicines. Read and follow all instructions on the label. ? If the doctor gave you a prescription medicine for pain, take it as prescribed. ? If you are not taking a prescription pain medicine, ask your doctor if you can take an over-the-counter medicine. · If your doctor prescribed antibiotics, take them as directed. Do not stop taking them just because you feel better. You need to take the full course of antibiotics. · Get extra rest until you feel better. To prevent future problems with your pancreas  · Do not drink alcohol. · Tell your doctors and pharmacist that you've had pancreatitis. They can help you avoid medicines that may cause this problem again. When should you call for help? [id number] anytime you think you may need emergency care.  For example, call if:  · You vomit blood or what looks like coffee grounds. · Your stools are maroon or very bloody. Call your doctor now or seek immediate medical care if:  · You have new or worse belly pain. · Your stools are black and look like tar, or they have streaks of blood. · You are vomiting. Watch closely for changes in your health, and be sure to contact your doctor if:  · You do not get better as expected. Where can you learn more? Go to http://www.gray.com/  Enter J381 in the search box to learn more about \"Pancreatitis: Care Instructions. \"  Current as of: August 12, 2019               Content Version: 12.5  © 9919-9852 [employer]wise, [employer]. Care instructions adapted under license by [employer] (which disclaims liability or warranty for this information). If you have questions about a medical condition or this instruction, always ask your healthcare professional. [address] any warranty or liability for your use of this information.

## 2020-08-23 NOTE — ED PROVIDER NOTES
48 yo AAF with PMhx htn presents with 1-day h/o chest pain, which pt describes as sharp pain to center of low chest.  Pt thought it was heartburn but took priolosec without full relief. Pt states pain has been constant today, notes she ate some fried chicken yesterday. No fevers, no vomiting, no other symptoms. 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    H/O:  10/6/2016    History of ectopic pregnancy 2016    History of  delivery, currently pregnant in first trimester 2016    Hypertension     Rh negative status during pregnancy in second trimester, antepartum 2016       Past Surgical History:   Procedure Laterality Date     DELIVERY ONLY      HX  SECTION      TIMES 2    HX SALPINGO-OOPHORECTOMY Left 2015    for ruptured ectopic; laparoscopic         Family History:   Problem Relation Age of Onset    Hypertension Mother     Diabetes Mother     Hypertension Father        Social History     Socioeconomic History    Marital status: SINGLE     Spouse name: Not on file    Number of children: Not on file    Years of education: Not on file    Highest education level: Not on file   Occupational History    Not on file   Social Needs    Financial resource strain: Not on file    Food insecurity     Worry: Not on file     Inability: Not on file    Transportation needs     Medical: Not on file     Non-medical: Not on file   Tobacco Use    Smoking status: Current Every Day Smoker     Packs/day: 0.50    Smokeless tobacco: Never Used   Substance and Sexual Activity    Alcohol use:  Yes    Drug use: No    Sexual activity: Yes     Partners: Male   Lifestyle    Physical activity     Days per week: Not on file     Minutes per session: Not on file    Stress: Not on file   Relationships  Social connections     Talks on phone: Not on file     Gets together: Not on file     Attends Jehovah's witness service: Not on file     Active member of club or organization: Not on file     Attends meetings of clubs or organizations: Not on file     Relationship status: Not on file    Intimate partner violence     Fear of current or ex partner: Not on file     Emotionally abused: Not on file     Physically abused: Not on file     Forced sexual activity: Not on file   Other Topics Concern    Not on file   Social History Narrative    Not on file         ALLERGIES: Iodine    Review of Systems   Constitutional: Negative for fever. HENT: Negative for trouble swallowing. Respiratory: Negative for shortness of breath. Cardiovascular: Positive for chest pain. Gastrointestinal: Negative for abdominal pain and vomiting. Genitourinary: Negative for difficulty urinating. Skin: Negative for wound. Neurological: Negative for syncope. Psychiatric/Behavioral: Negative for behavioral problems. All other systems reviewed and are negative. Vitals:    08/22/20 2202   BP: 130/84   Pulse: 88   Resp: 18   Temp: 98.6 °F (37 °C)   SpO2: 100%   Weight: 77.1 kg (170 lb)   Height: 5' 2\" (1.575 m)            Physical Exam  Vitals signs and nursing note reviewed. Constitutional:       General: She is not in acute distress. Appearance: She is well-developed. Comments: well-appearing, nad   HENT:      Head: Normocephalic and atraumatic. Neck:      Musculoskeletal: Normal range of motion. Cardiovascular:      Rate and Rhythm: Normal rate. Pulses: Normal pulses. Pulmonary:      Effort: Pulmonary effort is normal. No respiratory distress. Breath sounds: Normal breath sounds. Abdominal:      Palpations: Abdomen is soft. Tenderness: There is no abdominal tenderness. Musculoskeletal: Normal range of motion. Right lower leg: No edema. Left lower leg: No edema.       Comments: Mechanically stable   Skin:     General: Skin is warm. Neurological:      General: No focal deficit present. Mental Status: She is alert and oriented to person, place, and time. Comments: No focal deficits noted   Psychiatric:         Behavior: Behavior normal.          MDM  Number of Diagnoses or Management Options  Acute pancreatitis without infection or necrosis, unspecified pancreatitis type:   Diagnosis management comments: 48 yo AAF with PMHx htn presents with 1-day h/o chest pain. No fevers, no vomiting, no other symptoms. Examination unremarkable with no abdominal tenderness, ctab and no increased wob. Will evaluate for acute process. 11:21 PM  cxr ok. Lipase markedly elevated and presentation is consistent with acute, uncomplicated pancreatitis. Labs otherwise unremarkable. Pt doing ok, offered admission, but states she feels well enough to go home with medications. Discussed results and poc for dc home, symptom management, follow-up, return precautions.        Amount and/or Complexity of Data Reviewed  Clinical lab tests: ordered and reviewed  Tests in the radiology section of CPT®: ordered and reviewed  Review and summarize past medical records: yes  Independent visualization of images, tracings, or specimens: yes    Patient Progress  Patient progress: stable         EKG    Date/Time: 8/22/2020 10:06 PM  Performed by: Nader Calderon MD  Authorized by: Nader Calderon MD     ECG reviewed by ED Physician in the absence of a cardiologist: yes    Previous ECG:     Previous ECG:  Unavailable  Interpretation:     Interpretation: normal    Rate:     ECG rate:  93    ECG rate assessment: normal    Rhythm:     Rhythm: sinus rhythm    Ectopy:     Ectopy: none    QRS:     QRS axis:  Normal  Conduction:     Conduction: normal    ST segments:     ST segments:  Normal  T waves:     T waves: normal          PROGRESS NOTES    10:06 PM:   Nader Calderon MD arrives to the bedside to evaluate the patient. Answered the patient's questions regarding the treatment plan. CONSULTATIONS  None      MEDICATIONS ORDERED  Medications   ketorolac (TORADOL) injection 30 mg (has no administration in time range)   sodium chloride 0.9 % bolus infusion 1,000 mL (has no administration in time range)   sodium chloride 0.9 % bolus infusion 1,000 mL (1,000 mL IntraVENous New Bag 8/22/20 2283)       RADIOLOGY INTERPRETATIONS  XR CHEST PORT    (Results Pending)       EKG READINGS/LABORATORY RESULTS  Recent Results (from the past 12 hour(s))   EKG, 12 LEAD, INITIAL    Collection Time: 08/22/20  9:56 PM   Result Value Ref Range    Ventricular Rate 93 BPM    Atrial Rate 93 BPM    P-R Interval 184 ms    QRS Duration 84 ms    Q-T Interval 348 ms    QTC Calculation (Bezet) 432 ms    Calculated P Axis 48 degrees    Calculated R Axis 40 degrees    Calculated T Axis 62 degrees    Diagnosis       Normal sinus rhythm  Septal infarct , age undetermined  Abnormal ECG  No previous ECGs available     CBC WITH AUTOMATED DIFF    Collection Time: 08/22/20 10:14 PM   Result Value Ref Range    WBC 10.9 4.6 - 13.2 K/uL    RBC 4.14 (L) 4.20 - 5.30 M/uL    HGB 11.5 (L) 12.0 - 16.0 g/dL    HCT 36.0 35.0 - 45.0 %    MCV 87.0 74.0 - 97.0 FL    MCH 27.8 24.0 - 34.0 PG    MCHC 31.9 31.0 - 37.0 g/dL    RDW 16.2 (H) 11.6 - 14.5 %    PLATELET 541 631 - 660 K/uL    MPV 11.0 9.2 - 11.8 FL    NEUTROPHILS 71 40 - 73 %    LYMPHOCYTES 20 (L) 21 - 52 %    MONOCYTES 7 3 - 10 %    EOSINOPHILS 2 0 - 5 %    BASOPHILS 0 0 - 2 %    ABS. NEUTROPHILS 7.8 1.8 - 8.0 K/UL    ABS. LYMPHOCYTES 2.1 0.9 - 3.6 K/UL    ABS. MONOCYTES 0.8 0.05 - 1.2 K/UL    ABS. EOSINOPHILS 0.2 0.0 - 0.4 K/UL    ABS.  BASOPHILS 0.0 0.0 - 0.1 K/UL    DF AUTOMATED     METABOLIC PANEL, COMPREHENSIVE    Collection Time: 08/22/20 10:14 PM   Result Value Ref Range    Sodium 136 136 - 145 mmol/L    Potassium 4.0 3.5 - 5.5 mmol/L    Chloride 108 100 - 111 mmol/L    CO2 22 21 - 32 mmol/L    Anion gap 6 3.0 - 18 mmol/L    Glucose 108 (H) 74 - 99 mg/dL    BUN 13 7.0 - 18 MG/DL    Creatinine 0.76 0.6 - 1.3 MG/DL    BUN/Creatinine ratio 17 12 - 20      GFR est AA >60 >60 ml/min/1.73m2    GFR est non-AA >60 >60 ml/min/1.73m2    Calcium 8.7 8.5 - 10.1 MG/DL    Bilirubin, total 0.6 0.2 - 1.0 MG/DL    ALT (SGPT) 40 13 - 56 U/L    AST (SGOT) 65 (H) 10 - 38 U/L    Alk. phosphatase 95 45 - 117 U/L    Protein, total 8.2 6.4 - 8.2 g/dL    Albumin 3.8 3.4 - 5.0 g/dL    Globulin 4.4 (H) 2.0 - 4.0 g/dL    A-G Ratio 0.9 0.8 - 1.7     LIPASE    Collection Time: 08/22/20 10:14 PM   Result Value Ref Range    Lipase 2,842 (H) 73 - 393 U/L   TROPONIN I    Collection Time: 08/22/20 10:14 PM   Result Value Ref Range    Troponin-I, QT 0.02 0.0 - 0.045 NG/ML       ED DIAGNOSIS & DISPOSITION INFORMATION  Diagnosis:   1. Acute pancreatitis without infection or necrosis, unspecified pancreatitis type        Disposition: Discharged    Follow-up Information     Follow up With Specialties Details Why Contact Info    Francie Abernathy PA-C Physician Assistant Schedule an appointment as soon as possible for a visit  [address] [place] [address]      [address] EMERGENCY DEPT Emergency Medicine  As needed [address]  [phone]          Patient's Medications   Start Taking    HYDROCODONE-ACETAMINOPHEN (NORCO) 5-325 MG PER TABLET    Take 1 Tab by mouth every four (4) hours as needed for Pain for up to 5 days. Max Daily Amount: 6 Tabs. Continue Taking    AMLODIPINE (NORVASC) 5 MG TABLET    Take 5 mg by mouth daily. These Medications have changed    No medications on file   Stop Taking    IBUPROFEN (MOTRIN) 800 MG TABLET    Take 1 Tab by mouth every eight (8) hours as needed. NIFEDIPINE ER (PROCARDIA XL) 60 MG ER TABLET    Take 1 Tab by mouth daily. Indications: Hypertension    OXYCODONE-ACETAMINOPHEN (PERCOCET) 5-325 MG PER TABLET    Take 1-2 Tabs by mouth every four (4) hours as needed.  Max Daily Amount: 12 Tabs. PRENATAL VIT-IRON FUMARATE-FA (PRENATAL PLUS WITH IRON) 28 MG IRON- 800 MCG TAB    Take 1 Tab by mouth daily.  Indications: PREGNANCY           Lucio Rios MD.

## 2020-08-23 NOTE — ED NOTES
I have reviewed discharge instructions with the spouse. The patient verbalized understanding. No further questions at present. Patient ambulated safely to the [facility] with steady gait.

## 2020-08-24 LAB
ATRIAL RATE: 93 BPM
CALCULATED P AXIS, ECG09: 48 DEGREES
CALCULATED R AXIS, ECG10: 40 DEGREES
CALCULATED T AXIS, ECG11: 62 DEGREES
DIAGNOSIS, 93000: NORMAL
P-R INTERVAL, ECG05: 184 MS
Q-T INTERVAL, ECG07: 348 MS
QRS DURATION, ECG06: 84 MS
QTC CALCULATION (BEZET), ECG08: 432 MS
VENTRICULAR RATE, ECG03: 93 BPM

## 2020-09-15 ENCOUNTER — HOSPITAL ENCOUNTER (EMERGENCY)
Age: 40
Discharge: HOME | End: 2020-09-15
Attending: EMERGENCY MEDICINE
Payer: MEDICAID

## 2020-09-15 VITALS
DIASTOLIC BLOOD PRESSURE: 81 MMHG | WEIGHT: 170 LBS | BODY MASS INDEX: 31.28 KG/M2 | SYSTOLIC BLOOD PRESSURE: 126 MMHG | HEIGHT: 62 IN | OXYGEN SATURATION: 100 % | RESPIRATION RATE: 18 BRPM | HEART RATE: 97 BPM | TEMPERATURE: 98 F

## 2020-09-15 DIAGNOSIS — K85.20 ALCOHOL-INDUCED ACUTE PANCREATITIS, UNSPECIFIED COMPLICATION STATUS: Primary | ICD-10-CM

## 2020-09-15 LAB
ALBUMIN SERPL-MCNC: 3.8 G/DL (ref 3.4–5)
ALBUMIN/GLOB SERPL: 1 {RATIO} (ref 0.8–1.7)
ALP SERPL-CCNC: 91 U/L (ref 45–117)
ALT SERPL-CCNC: 31 U/L (ref 13–56)
ANION GAP SERPL CALC-SCNC: 6 MMOL/L (ref 3–18)
AST SERPL-CCNC: 33 U/L (ref 10–38)
BASOPHILS # BLD: 0 K/UL (ref 0–0.1)
BASOPHILS NFR BLD: 0 % (ref 0–2)
BILIRUB SERPL-MCNC: 0.4 MG/DL (ref 0.2–1)
BUN SERPL-MCNC: 7 MG/DL (ref 7–18)
BUN/CREAT SERPL: 9 (ref 12–20)
CALCIUM SERPL-MCNC: 9.2 MG/DL (ref 8.5–10.1)
CHLORIDE SERPL-SCNC: 110 MMOL/L (ref 100–111)
CO2 SERPL-SCNC: 23 MMOL/L (ref 21–32)
CREAT SERPL-MCNC: 0.78 MG/DL (ref 0.6–1.3)
DIFFERENTIAL METHOD BLD: ABNORMAL
EOSINOPHIL # BLD: 0.3 K/UL (ref 0–0.4)
EOSINOPHIL NFR BLD: 4 % (ref 0–5)
ERYTHROCYTE [DISTWIDTH] IN BLOOD BY AUTOMATED COUNT: 16.3 % (ref 11.6–14.5)
GLOBULIN SER CALC-MCNC: 4 G/DL (ref 2–4)
GLUCOSE SERPL-MCNC: 101 MG/DL (ref 74–99)
HCT VFR BLD AUTO: 36.4 % (ref 35–45)
HGB BLD-MCNC: 11.4 G/DL (ref 12–16)
LIPASE SERPL-CCNC: 1187 U/L (ref 73–393)
LYMPHOCYTES # BLD: 2.9 K/UL (ref 0.9–3.6)
LYMPHOCYTES NFR BLD: 31 % (ref 21–52)
MCH RBC QN AUTO: 27.1 PG (ref 24–34)
MCHC RBC AUTO-ENTMCNC: 31.3 G/DL (ref 31–37)
MCV RBC AUTO: 86.5 FL (ref 74–97)
MONOCYTES # BLD: 0.9 K/UL (ref 0.05–1.2)
MONOCYTES NFR BLD: 10 % (ref 3–10)
NEUTS SEG # BLD: 5.1 K/UL (ref 1.8–8)
NEUTS SEG NFR BLD: 55 % (ref 40–73)
PLATELET # BLD AUTO: 366 K/UL (ref 135–420)
PMV BLD AUTO: 11.1 FL (ref 9.2–11.8)
POTASSIUM SERPL-SCNC: 4.3 MMOL/L (ref 3.5–5.5)
PROT SERPL-MCNC: 7.8 G/DL (ref 6.4–8.2)
RBC # BLD AUTO: 4.21 M/UL (ref 4.2–5.3)
SALICYLATES SERPL-MCNC: <1.7 MG/DL (ref 2.8–20)
SODIUM SERPL-SCNC: 139 MMOL/L (ref 136–145)
WBC # BLD AUTO: 9.2 K/UL (ref 4.6–13.2)

## 2020-09-15 PROCEDURE — 74011250636 HC RX REV CODE- 250/636: Performed by: PHYSICIAN ASSISTANT

## 2020-09-15 PROCEDURE — 80307 DRUG TEST PRSMV CHEM ANLYZR: CPT

## 2020-09-15 PROCEDURE — 83690 ASSAY OF LIPASE: CPT

## 2020-09-15 PROCEDURE — 96374 THER/PROPH/DIAG INJ IV PUSH: CPT

## 2020-09-15 PROCEDURE — 80053 COMPREHEN METABOLIC PANEL: CPT

## 2020-09-15 PROCEDURE — 99283 EMERGENCY DEPT VISIT LOW MDM: CPT

## 2020-09-15 PROCEDURE — 85025 COMPLETE CBC W/AUTO DIFF WBC: CPT

## 2020-09-15 RX ORDER — ONDANSETRON 4 MG/1
8 TABLET, FILM COATED ORAL
Qty: 12 TAB | Refills: 0 | Status: SHIPPED | OUTPATIENT
Start: 2020-09-15 | End: 2020-09-15

## 2020-09-15 RX ORDER — HYDROCODONE BITARTRATE AND ACETAMINOPHEN 5; 325 MG/1; MG/1
1 TABLET ORAL
Qty: 9 TAB | Refills: 0 | Status: SHIPPED | OUTPATIENT
Start: 2020-09-15 | End: 2020-09-15

## 2020-09-15 RX ORDER — MORPHINE SULFATE 4 MG/ML
4 INJECTION, SOLUTION INTRAMUSCULAR; INTRAVENOUS
Status: COMPLETED | OUTPATIENT
Start: 2020-09-15 | End: 2020-09-15

## 2020-09-15 RX ORDER — HYDROCODONE BITARTRATE AND ACETAMINOPHEN 5; 325 MG/1; MG/1
1 TABLET ORAL
Qty: 9 TAB | Refills: 0 | Status: SHIPPED | OUTPATIENT
Start: 2020-09-15 | End: 2020-09-18

## 2020-09-15 RX ORDER — ONDANSETRON 4 MG/1
8 TABLET, FILM COATED ORAL
Qty: 12 TAB | Refills: 0 | Status: SHIPPED | OUTPATIENT
Start: 2020-09-15

## 2020-09-15 RX ADMIN — SODIUM CHLORIDE 1000 ML: 900 INJECTION, SOLUTION INTRAVENOUS at 14:38

## 2020-09-15 RX ADMIN — MORPHINE SULFATE 4 MG: 4 INJECTION, SOLUTION INTRAMUSCULAR; INTRAVENOUS at 16:01

## 2020-09-15 RX ADMIN — SODIUM CHLORIDE 1000 ML: 900 INJECTION, SOLUTION INTRAVENOUS at 16:01

## 2020-09-15 NOTE — DISCHARGE INSTRUCTIONS
Patient Education     Please return immediately to the Emergency Room for re-evaluation if you are not improving, develop any new symptoms, or develop worsening of current symptoms! If you have been prescribed a medication and are unable to take this medication for any reason, please return to the Emergency Department for further evaluation! If you have been referred for follow-up to a specialist, but are unable to follow-up and your symptoms are either not improving or are worsening, please return to the Emergency Department for further evaluation! Pancreatitis: Care Instructions  Your Care Instructions     The pancreas is an organ behind the stomach. It makes hormones and enzymes to help your body digest food. But if these enzymes attack the pancreas, it can get inflamed. This is called pancreatitis. Most cases are caused by gallstones or by heavy alcohol use. If you take care of yourself at home, it will help you get better. It will also help you avoid more problems with your pancreas. Follow-up care is a key part of your treatment and safety. Be sure to make and go to all appointments, and call your doctor if you are having problems. It's also a good idea to know your test results and keep a list of the medicines you take. How can you care for yourself at home? · Drink clear liquids and eat bland foods until you feel better. [place] foods include rice, dry toast, and crackers. They also include bananas and applesauce. · Eat a low-fat diet until your doctor says your pancreas is healed. · Do not drink alcohol. Tell your doctor if you need help to quit. Counseling, support groups, and sometimes medicines can help you stay sober. · Be safe with medicines. Read and follow all instructions on the label. ? If the doctor gave you a prescription medicine for pain, take it as prescribed.   ? If you are not taking a prescription pain medicine, ask your doctor if you can take an over-the-counter medicine. · If your doctor prescribed antibiotics, take them as directed. Do not stop taking them just because you feel better. You need to take the full course of antibiotics. · Get extra rest until you feel better. To prevent future problems with your pancreas  · Do not drink alcohol. · Tell your doctors and pharmacist that you've had pancreatitis. They can help you avoid medicines that may cause this problem again. When should you call for help? Call 911 anytime you think you may need emergency care. For example, call if:    · You vomit blood or what looks like coffee grounds.     · Your stools are maroon or very bloody. Call your doctor now or seek immediate medical care if:    · You have new or worse belly pain.     · Your stools are black and look like tar, or they have streaks of blood.     · You are vomiting. Watch closely for changes in your health, and be sure to contact your doctor if:    · You do not get better as expected. Where can you learn more? Go to http://pancho-humberto.info/  Enter J381 in the search box to learn more about \"Pancreatitis: Care Instructions. \"  Current as of: April 15, 2020               Content Version: 12.6  © 1234-2538 [employer]. Care instructions adapted under license by [employer] (which disclaims liability or warranty for this information). If you have questions about a medical condition or this instruction, always ask your healthcare professional. [place] [address] any warranty or liability for your use of this information.

## 2020-09-15 NOTE — ED NOTES
I performed a brief history of the patient here in triage and I have determined that pt will need further treatment and evaluation from the main side ER physician. I have placed initial orders based on the history to help in expediting patients care. Abd pain, hx of pancreatitis, taking 800mg Ibuprofen every 3 hours for the past 3-4 days for pain.        Visit Vitals  BP (!) 138/94 (BP 1 Location: Right arm, BP Patient Position: At rest)   Pulse 97   Temp 98 °F (36.7 °C)   Resp 16   Ht 5' 2\" (1.575 m)   Wt 77.1 kg (170 lb)   SpO2 100%   BMI 31.09 kg/m²      ANDIE Anne 2:12 PM

## 2020-09-15 NOTE — ED TRIAGE NOTES
Seen 8/22 in ED for pancreatitis. Had virtual appointment with PCP today. Continues to have abdominal pain.  Told to go to ED

## 2020-09-15 NOTE — ED PROVIDER NOTES
EMERGENCY DEPARTMENT HISTORY AND PHYSICAL EXAM    5:07 PM      Date: 9/15/2020  Patient Name: Emmie Gray    History of Presenting Illness     Chief Complaint   Patient presents with    Abdominal Pain    Pancreatitis       History Provided By: Patient    Chief Complaint: Epigastric abdominal pain, nausea/vomiting, pancreatitis  Duration:4-5  Days  Timing:  Acute  Location:   Quality: Aching  Severity: Moderate  Modifying Factors: not relieved by ibuprofen  Associated Symptoms: denies any other associated signs or symptoms      Additional History (Context):Shannon Amaya is a 47 y.o. female with a pertinent history of pancreatitis, HTN, celiac disease who presents to the emergency department for evaluation of continued epigastric abdominal pain with associated nausea and vomiting over the past 4 5 days. Patient reports history of pancreatitis and believes she has pancreatitis again. Symptoms began after consuming multiple cocktails. Patient reports being told that she needed to have her liver checked out, but cannot recall why. She states that this was approximately 3 years ago, but she never followed up because she was pregnant at the time. No associated constipation or diarrhea. Patient states she has been taking 800 mg of ibuprofen every few hours to help with her pain, but it is not effective. Patient denies possibility of pregnancy here today. Patient did call and have a telemedicine visit with her primary care physician today for these complaints. No urinary symptoms, fever, chills, chest pain, shortness of breath, cough, URI symptoms, or any other concerns. PCP:  Elizabeth Squires PA-C        Current Outpatient Medications   Medication Sig Dispense Refill    HYDROcodone-acetaminophen (Norco) 5-325 mg per tablet Take 1 Tab by mouth every eight (8) hours as needed for Pain for up to 3 days. Max Daily Amount: 3 Tabs.  9 Tab 0    ondansetron hcl (Zofran) 4 mg tablet Take 2 Tabs by mouth every eight (8) hours as needed for Nausea. 12 Tab 0    amLODIPine (NORVASC) 5 mg tablet Take 5 mg by mouth daily. Past History     Past Medical History:  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    H/O:  10/6/2016    History of ectopic pregnancy 2016    History of  delivery, currently pregnant in first trimester 2016    Hypertension     Rh negative status during pregnancy in second trimester, antepartum 2016       Past Surgical History:  Past Surgical History:   Procedure Laterality Date     DELIVERY ONLY      HX  SECTION      TIMES 2    HX SALPINGO-OOPHORECTOMY Left 2015    for ruptured ectopic; laparoscopic       Family History:  Family History   Problem Relation Age of Onset    Hypertension Mother     Diabetes Mother     Hypertension Father        Social History:  Social History     Tobacco Use    Smoking status: Current Every Day Smoker     Packs/day: 0.50    Smokeless tobacco: Never Used   Substance Use Topics    Alcohol use: Yes    Drug use: No       Allergies: Allergies   Allergen Reactions    Iodine Hives and Nausea and Vomiting     MRI DYE          Review of Systems       Review of Systems   Constitutional: Negative for chills and fever. HENT: Negative for congestion, rhinorrhea and sore throat. Respiratory: Negative for cough and shortness of breath. Cardiovascular: Negative for chest pain. Gastrointestinal: Positive for abdominal pain, nausea and vomiting. Negative for blood in stool, constipation and diarrhea. Genitourinary: Negative for dysuria, frequency and hematuria. Musculoskeletal: Negative for back pain and myalgias. Skin: Negative for rash and wound. Neurological: Negative for dizziness and headaches.    All other systems reviewed and are negative. Physical Exam     Visit Vitals  /81   Pulse 97   Temp 98 °F (36.7 °C)   Resp 18   Ht 5' 2\" (1.575 m)   Wt 77.1 kg (170 lb)   LMP 08/27/2020   SpO2 100%   BMI 31.09 kg/m²       Physical Exam  Vitals signs and nursing note reviewed. Constitutional:       General: She is not in acute distress. Appearance: She is well-developed. She is not diaphoretic. Comments: Well-appearing, nontoxic, resting on stretcher in no distress   HENT:      Head: Normocephalic and atraumatic. Eyes:      Conjunctiva/sclera: Conjunctivae normal.   Neck:      Musculoskeletal: Normal range of motion and neck supple. Cardiovascular:      Rate and Rhythm: Normal rate and regular rhythm. Heart sounds: Normal heart sounds. Pulmonary:      Effort: Pulmonary effort is normal. No respiratory distress. Breath sounds: Normal breath sounds. Chest:      Chest wall: No tenderness. Abdominal:      General: Bowel sounds are normal. There is distension. Palpations: Abdomen is soft. Tenderness: There is abdominal tenderness in the epigastric area. There is no guarding or rebound. Comments: Mild tenderness to palpation to epigastric region. Otherwise abdominal exam is unremarkable without tenderness, rebound, rigidity, or guarding. Abdomen does reveal mildly distended, but is soft. Normoactive bowel sounds all quadrants. Musculoskeletal:         General: No deformity. Skin:     General: Skin is warm and dry. Neurological:      Mental Status: She is alert and oriented to person, place, and time. Deep Tendon Reflexes: Reflexes are normal and symmetric. Diagnostic Study Results     Labs -  No results found for this or any previous visit (from the past 12 hour(s)). Radiologic Studies -   No results found. Medical Decision Making   I am the first provider for this patient.     I reviewed the vital signs, available nursing notes, past medical history, past surgical history, family history and social history. Vital Signs-Reviewed the patient's vital signs. Pulse Oximetry Analysis -  100% on room air (Interpretation)    Records Reviewed: Nursing Notes and Old Medical Records (Time of Review: 5:07 PM)    ED Course: Progress Notes, Reevaluation, and Consults:    Provider Notes (Medical Decision Making):   differential diagnosis: Pancreatitis, gastritis, salicylate toxicity, dehydration, electrolyte abnormality    Plan: Patient presents ambulatory in no significant distress with normal vitals. Exam of abdomen reveals minimal epigastric tenderness palpation without peritoneal signs. Lipase is elevated, but improved from recent. Patient advised that alcohol is the likely culprit. Informed patient she should cut back on EtOH consumption. Subtherapeutic salicylate level -it is unlikely that patient has been taking 800 mg ibuprofen every 2 or 3 hours today as she indicates she has been doing. Remainder of work-up is unremarkable. Symptoms controlled after 1 dose of medications here. Will discharge home with Zofran and pain medication. Patient advised to stay hydrated and follow a clear liquid diet until pain has resolved entirely. At this time, patient is stable and appropriate for discharge home. Patient demonstrates understanding of current diagnoses and is in agreement with the treatment plan. They are advised that while the likelihood of serious underlying condition is low at this point given the evaluation performed today, we cannot fully rule it out. They are advised to immediately return with any new symptoms or worsening of current condition. All questions have been answered. Patient is given educational material regarding their diagnoses, including danger symptoms and when to return to the ED. Follow-up with GI for further evaluation of liver. Diagnosis     Clinical Impression:   1.  Alcohol-induced acute pancreatitis, unspecified complication status Disposition: DC Home    Follow-up Information     Follow up With Specialties Details Why Contact Info    Taisha Herrera MD Gastroenterology Call in 2 days regarding your pancreatitis and need for liver evaluation [address]  [address] EMERGENCY DEPT Emergency Medicine Go to As needed, If symptoms worsen [address]  [phone]           Discharge Medication List as of 9/15/2020  5:12 PM      START taking these medications    Details   HYDROcodone-acetaminophen (Norco) 5-325 mg per tablet Take 1 Tab by mouth every eight (8) hours as needed for Pain for up to 3 days. Max Daily Amount: 3 Tabs., Normal, Disp-9 Tab,R-0      ondansetron hcl (Zofran) 4 mg tablet Take 2 Tabs by mouth every eight (8) hours as needed for Nausea., Normal, Disp-12 Tab,R-0         CONTINUE these medications which have NOT CHANGED    Details   amLODIPine (NORVASC) 5 mg tablet Take 5 mg by mouth daily. , Historical Med           _______________________________    This note was dictated utilizing voice recognition software which may lead to typographical errors. I apologize in advance if the situation occurs. If questions arise please do not hesitate to contact me or call our department.   Richie Finch PA-C

## 2020-10-10 ENCOUNTER — HOSPITAL ENCOUNTER (EMERGENCY)
Age: 40
Discharge: HOME | End: 2020-10-10
Attending: STUDENT IN AN ORGANIZED HEALTH CARE EDUCATION/TRAINING PROGRAM
Payer: MEDICAID

## 2020-10-10 VITALS
RESPIRATION RATE: 18 BRPM | DIASTOLIC BLOOD PRESSURE: 93 MMHG | SYSTOLIC BLOOD PRESSURE: 151 MMHG | BODY MASS INDEX: 31.28 KG/M2 | TEMPERATURE: 98.8 F | WEIGHT: 170 LBS | HEART RATE: 95 BPM | OXYGEN SATURATION: 99 % | HEIGHT: 62 IN

## 2020-10-10 DIAGNOSIS — K85.90 ACUTE PANCREATITIS WITHOUT INFECTION OR NECROSIS, UNSPECIFIED PANCREATITIS TYPE: Primary | ICD-10-CM

## 2020-10-10 LAB
ALBUMIN SERPL-MCNC: 3.9 G/DL (ref 3.4–5)
ALBUMIN/GLOB SERPL: 1 {RATIO} (ref 0.8–1.7)
ALP SERPL-CCNC: 95 U/L (ref 45–117)
ALT SERPL-CCNC: 22 U/L (ref 13–56)
ANION GAP SERPL CALC-SCNC: 5 MMOL/L (ref 3–18)
APPEARANCE UR: CLEAR
AST SERPL-CCNC: 14 U/L (ref 10–38)
ATRIAL RATE: 80 BPM
BASOPHILS # BLD: 0 K/UL (ref 0–0.1)
BASOPHILS NFR BLD: 0 % (ref 0–2)
BILIRUB DIRECT SERPL-MCNC: 0.2 MG/DL (ref 0–0.2)
BILIRUB SERPL-MCNC: 0.7 MG/DL (ref 0.2–1)
BILIRUB UR QL: NEGATIVE
BUN SERPL-MCNC: 5 MG/DL (ref 7–18)
BUN/CREAT SERPL: 7 (ref 12–20)
CALCIUM SERPL-MCNC: 8.6 MG/DL (ref 8.5–10.1)
CALCULATED P AXIS, ECG09: 29 DEGREES
CALCULATED R AXIS, ECG10: 26 DEGREES
CALCULATED T AXIS, ECG11: 42 DEGREES
CHLORIDE SERPL-SCNC: 107 MMOL/L (ref 100–111)
CO2 SERPL-SCNC: 26 MMOL/L (ref 21–32)
COLOR UR: YELLOW
CREAT SERPL-MCNC: 0.7 MG/DL (ref 0.6–1.3)
DIAGNOSIS, 93000: NORMAL
DIFFERENTIAL METHOD BLD: ABNORMAL
EOSINOPHIL # BLD: 0.3 K/UL (ref 0–0.4)
EOSINOPHIL NFR BLD: 3 % (ref 0–5)
ERYTHROCYTE [DISTWIDTH] IN BLOOD BY AUTOMATED COUNT: 16.1 % (ref 11.6–14.5)
ETHANOL SERPL-MCNC: <3 MG/DL (ref 0–3)
GLOBULIN SER CALC-MCNC: 4 G/DL (ref 2–4)
GLUCOSE SERPL-MCNC: 97 MG/DL (ref 74–99)
GLUCOSE UR STRIP.AUTO-MCNC: NEGATIVE MG/DL
HCG UR QL: NEGATIVE
HCT VFR BLD AUTO: 34.3 % (ref 35–45)
HGB BLD-MCNC: 10.9 G/DL (ref 12–16)
HGB UR QL STRIP: NEGATIVE
KETONES UR QL STRIP.AUTO: NEGATIVE MG/DL
LEUKOCYTE ESTERASE UR QL STRIP.AUTO: NEGATIVE
LIPASE SERPL-CCNC: 2847 U/L (ref 73–393)
LYMPHOCYTES # BLD: 3.5 K/UL (ref 0.9–3.6)
LYMPHOCYTES NFR BLD: 37 % (ref 21–52)
MCH RBC QN AUTO: 26.9 PG (ref 24–34)
MCHC RBC AUTO-ENTMCNC: 31.8 G/DL (ref 31–37)
MCV RBC AUTO: 84.7 FL (ref 74–97)
MONOCYTES # BLD: 0.8 K/UL (ref 0.05–1.2)
MONOCYTES NFR BLD: 8 % (ref 3–10)
NEUTS SEG # BLD: 5.1 K/UL (ref 1.8–8)
NEUTS SEG NFR BLD: 52 % (ref 40–73)
NITRITE UR QL STRIP.AUTO: NEGATIVE
P-R INTERVAL, ECG05: 204 MS
PH UR STRIP: 7.5 [PH] (ref 5–8)
PLATELET # BLD AUTO: 304 K/UL (ref 135–420)
PMV BLD AUTO: 11.2 FL (ref 9.2–11.8)
POTASSIUM SERPL-SCNC: 3.5 MMOL/L (ref 3.5–5.5)
PROT SERPL-MCNC: 7.9 G/DL (ref 6.4–8.2)
PROT UR STRIP-MCNC: NEGATIVE MG/DL
Q-T INTERVAL, ECG07: 364 MS
QRS DURATION, ECG06: 86 MS
QTC CALCULATION (BEZET), ECG08: 419 MS
RBC # BLD AUTO: 4.05 M/UL (ref 4.2–5.3)
SODIUM SERPL-SCNC: 138 MMOL/L (ref 136–145)
SP GR UR REFRACTOMETRY: 1.01 (ref 1–1.03)
UROBILINOGEN UR QL STRIP.AUTO: 1 EU/DL (ref 0.2–1)
VENTRICULAR RATE, ECG03: 80 BPM
WBC # BLD AUTO: 9.6 K/UL (ref 4.6–13.2)

## 2020-10-10 PROCEDURE — 80048 BASIC METABOLIC PNL TOTAL CA: CPT

## 2020-10-10 PROCEDURE — 74011250637 HC RX REV CODE- 250/637: Performed by: STUDENT IN AN ORGANIZED HEALTH CARE EDUCATION/TRAINING PROGRAM

## 2020-10-10 PROCEDURE — 83690 ASSAY OF LIPASE: CPT

## 2020-10-10 PROCEDURE — 80076 HEPATIC FUNCTION PANEL: CPT

## 2020-10-10 PROCEDURE — 80307 DRUG TEST PRSMV CHEM ANLYZR: CPT

## 2020-10-10 PROCEDURE — 96375 TX/PRO/DX INJ NEW DRUG ADDON: CPT

## 2020-10-10 PROCEDURE — 99284 EMERGENCY DEPT VISIT MOD MDM: CPT

## 2020-10-10 PROCEDURE — 81003 URINALYSIS AUTO W/O SCOPE: CPT

## 2020-10-10 PROCEDURE — 96374 THER/PROPH/DIAG INJ IV PUSH: CPT

## 2020-10-10 PROCEDURE — 85025 COMPLETE CBC W/AUTO DIFF WBC: CPT

## 2020-10-10 PROCEDURE — 81025 URINE PREGNANCY TEST: CPT

## 2020-10-10 PROCEDURE — 93005 ELECTROCARDIOGRAM TRACING: CPT

## 2020-10-10 PROCEDURE — 74011250636 HC RX REV CODE- 250/636: Performed by: STUDENT IN AN ORGANIZED HEALTH CARE EDUCATION/TRAINING PROGRAM

## 2020-10-10 RX ORDER — ACETAMINOPHEN 325 MG/1
650 TABLET ORAL
Qty: 20 TAB | Refills: 0 | Status: SHIPPED | OUTPATIENT
Start: 2020-10-10

## 2020-10-10 RX ORDER — LORAZEPAM 0.5 MG/1
0.5 TABLET ORAL
COMMUNITY

## 2020-10-10 RX ORDER — ONDANSETRON 2 MG/ML
4 INJECTION INTRAMUSCULAR; INTRAVENOUS
Status: COMPLETED | OUTPATIENT
Start: 2020-10-10 | End: 2020-10-10

## 2020-10-10 RX ORDER — POTASSIUM CHLORIDE 20 MEQ/1
40 TABLET, EXTENDED RELEASE ORAL
Status: COMPLETED | OUTPATIENT
Start: 2020-10-10 | End: 2020-10-10

## 2020-10-10 RX ORDER — ONDANSETRON 4 MG/1
4 TABLET, FILM COATED ORAL
Qty: 12 TAB | Refills: 0 | Status: SHIPPED | OUTPATIENT
Start: 2020-10-10

## 2020-10-10 RX ORDER — KETOROLAC TROMETHAMINE 15 MG/ML
15 INJECTION, SOLUTION INTRAMUSCULAR; INTRAVENOUS ONCE
Status: COMPLETED | OUTPATIENT
Start: 2020-10-10 | End: 2020-10-10

## 2020-10-10 RX ADMIN — POTASSIUM CHLORIDE 40 MEQ: 1500 TABLET, EXTENDED RELEASE ORAL at 07:54

## 2020-10-10 RX ADMIN — KETOROLAC TROMETHAMINE 15 MG: 15 INJECTION, SOLUTION INTRAMUSCULAR; INTRAVENOUS at 07:03

## 2020-10-10 RX ADMIN — SODIUM CHLORIDE 1000 ML: 900 INJECTION, SOLUTION INTRAVENOUS at 07:03

## 2020-10-10 RX ADMIN — ONDANSETRON 4 MG: 2 INJECTION INTRAMUSCULAR; INTRAVENOUS at 07:03

## 2020-10-10 NOTE — DISCHARGE INSTRUCTIONS
Patient Education        Pancreatitis: Care Instructions  Your Care Instructions     The pancreas is an organ behind the stomach. It makes hormones and enzymes to help your body digest food. But if these enzymes attack the pancreas, it can get inflamed. This is called pancreatitis. Most cases are caused by gallstones or by heavy alcohol use. If you take care of yourself at home, it will help you get better. It will also help you avoid more problems with your pancreas. Follow-up care is a key part of your treatment and safety. Be sure to make and go to all appointments, and call your doctor if you are having problems. It's also a good idea to know your test results and keep a list of the medicines you take. How can you care for yourself at home? · Drink clear liquids and eat bland foods until you feel better. Counce foods include rice, dry toast, and crackers. They also include bananas and applesauce. · Eat a low-fat diet until your doctor says your pancreas is healed. · Do not drink alcohol. Tell your doctor if you need help to quit. Counseling, support groups, and sometimes medicines can help you stay sober. · Be safe with medicines. Read and follow all instructions on the label. ? If the doctor gave you a prescription medicine for pain, take it as prescribed. ? If you are not taking a prescription pain medicine, ask your doctor if you can take an over-the-counter medicine. · If your doctor prescribed antibiotics, take them as directed. Do not stop taking them just because you feel better. You need to take the full course of antibiotics. · Get extra rest until you feel better. To prevent future problems with your pancreas  · Do not drink alcohol. · Tell your doctors and pharmacist that you've had pancreatitis. They can help you avoid medicines that may cause this problem again. When should you call for help? Call 911 anytime you think you may need emergency care.  For example, call if:    · You vomit blood or what looks like coffee grounds.     · Your stools are maroon or very bloody. Call your doctor now or seek immediate medical care if:    · You have new or worse belly pain.     · Your stools are black and look like tar, or they have streaks of blood.     · You are vomiting. Watch closely for changes in your health, and be sure to contact your doctor if:    · You do not get better as expected. Where can you learn more? Go to http://www.gray.com/  Enter J381 in the search box to learn more about \"Pancreatitis: Care Instructions. \"  Current as of: April 15, 2020               Content Version: 12.6  © 2667-5792 [employer]. Care instructions adapted under license by [employer] (which disclaims liability or warranty for this information). If you have questions about a medical condition or this instruction, always ask your healthcare professional. [address] any warranty or liability for your use of this information.

## 2020-10-10 NOTE — ED NOTES
Pt to ED with complaints of bilateral upper abdominal pain that started two days ago and has not improved. Pt reports history of pancreatitis, states she has appointment with GI on Nov 2nd.

## 2020-10-10 NOTE — ED PROVIDER NOTES
68-year-old female with past medical history significant for hypertension and pancreatitis presents to the ED with complaint of a constant, burning epigastric pain that is been present for the past 2 days. She says it feels similar to episodes of pancreatitis in the past but feels worse than usual.  She has been unable to eat anything since the pain started and feels nauseous but she is able to drink liquids. Her last alcoholic drink was yesterday and she said she had 2 cranberry and vodka drinks after work. The first day of her last menstrual period was on  and was normal.  She has an upcoming appointment with a gastroenterologist scheduled for . She denies any fevers, chills, urinary symptoms or any other complaints. She smokes cigarettes daily. She denies any recreational drug use.            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    H/O:  10/6/2016    History of ectopic pregnancy 2016    History of  delivery, currently pregnant in first trimester 2016    Hypertension     Rh negative status during pregnancy in second trimester, antepartum 2016       Past Surgical History:   Procedure Laterality Date     DELIVERY ONLY      HX  SECTION      TIMES 2    HX SALPINGO-OOPHORECTOMY Left 2015    for ruptured ectopic; laparoscopic         Family History:   Problem Relation Age of Onset    Hypertension Mother     Diabetes Mother     Hypertension Father        Social History     Socioeconomic History    Marital status:      Spouse name: Not on file    Number of children: Not on file    Years of education: Not on file    Highest education level: Not on file   Occupational History    Not on file   Social Needs    Financial resource strain: Not on file   Luz-Sandra insecurity     Worry: Not on file     Inability: Not on file    Transportation needs     Medical: Not on file     Non-medical: Not on file   Tobacco Use    Smoking status: Current Every Day Smoker     Packs/day: 0.50    Smokeless tobacco: Never Used   Substance and Sexual Activity    Alcohol use: Yes    Drug use: No    Sexual activity: Yes     Partners: Male   Lifestyle    Physical activity     Days per week: Not on file     Minutes per session: Not on file    Stress: Not on file   Relationships    Social connections     Talks on phone: Not on file     Gets together: Not on file     Attends Sikhism service: Not on file     Active member of club or organization: Not on file     Attends meetings of clubs or organizations: Not on file     Relationship status: Not on file    Intimate partner violence     Fear of current or ex partner: Not on file     Emotionally abused: Not on file     Physically abused: Not on file     Forced sexual activity: Not on file   Other Topics Concern    Not on file   Social History Narrative    Not on file         ALLERGIES: Iodine    Review of Systems   Constitutional: Negative for activity change and appetite change. HENT: Negative for drooling and facial swelling. Eyes: Negative for pain and discharge. Respiratory: Negative for apnea and choking. Cardiovascular: Negative for palpitations and leg swelling. Gastrointestinal: Positive for abdominal pain and nausea. Negative for blood in stool and rectal pain. Endocrine: Negative for polydipsia and polyphagia. Genitourinary: Negative for genital sores and hematuria. Musculoskeletal: Negative for gait problem and neck stiffness. Skin: Negative for color change and rash. Allergic/Immunologic: Negative for environmental allergies. Neurological: Negative for tremors. Hematological: Negative for adenopathy. Psychiatric/Behavioral: Negative for agitation and behavioral problems.        Vitals:    10/10/20 0643   BP: (!) 152/105   Pulse: 95   Resp: 18   Temp: 98.8 °F (37.1 °C)   SpO2: 100%   Weight: 77.1 kg (170 lb)   Height: 5' 2\" (1.575 m)            Physical Exam  Vitals signs and nursing note reviewed. Constitutional:       Appearance: Normal appearance. She is obese. HENT:      Head: Normocephalic and atraumatic. Eyes:      Extraocular Movements: Extraocular movements intact. Pupils: Pupils are equal, round, and reactive to light. Cardiovascular:      Rate and Rhythm: Normal rate and regular rhythm. Heart sounds: Normal heart sounds. No murmur. No friction rub. Pulmonary:      Effort: Pulmonary effort is normal.      Breath sounds: Normal breath sounds. Abdominal:      Palpations: Abdomen is soft. Comments: Mild epigastric and bilateral upper quadrant tenderness to palpation. Abdomen soft. No rebound, rigidity, guarding. Musculoskeletal: Normal range of motion. Skin:     General: Skin is warm and dry. Capillary Refill: Capillary refill takes less than 2 seconds. Neurological:      General: No focal deficit present. Mental Status: She is alert and oriented to person, place, and time. Psychiatric:         Mood and Affect: Mood normal.          MDM  Number of Diagnoses or Management Options  Acute pancreatitis without infection or necrosis, unspecified pancreatitis type:   Diagnosis management comments: 82-year-old female with history of pancreatitis here with epigastric pain that feels the same as prior episodes of pancreatitis. Given that patient's abdominal exam is completely benign will forego imaging of the abdomen and pelvis at this time. Will perform laboratory work-up including LFTs and lipase. UA and urine pregnancy test.  IV fluid bolus. Zofran for nausea. Toradol for pain control. Given history of hypertension will obtain screening EKG. Will reassess.     EKG is normal sinus rhythm at a rate of 80 with a prolonged OK interval at 204 ms, no ST elevations or depressions, T wave inversion in lead V2 as interpreted by me. Laboratory work-up is significant for a mild hypokalemia to 3.5 as well as a lipase of 2,847 which is consistent with a diagnosis of pancreatitis. Julio Cesar score 0. Potassium repleted with a single tablet of K-Dur. Patient feels better after receiving Toradol, Zofran and IV fluids. She is able to tolerate PO. I counseled her on cessation of alcohol use and instructed her to follow-up with her primary doctor and her upcoming gastroenterology appointment on November 2. She is requesting a prescription for Tylenol and Zofran. Pt has been reexamined. Patient has no new complaints, changes, or physical findings. Care plan outlined and precautions discussed. Results were reviewed with the patient. All medications were reviewed with the patient; will d/c home with PMD f/u. All of pt's questions and concerns were addressed. Patient was instructed and agrees to follow up with PMD, as well as to return to the ED upon further deterioration. Patient is ready to go home. This note was dictated utilizing voice recognition software which may lead to typographical errors.  I apologize in advance if the situation occurs.  If questions arise please do not hesitate to contact me or call our department.     Heidy Nicholas, DO           Procedures

## 2023-11-22 NOTE — ANESTHESIA PREPROCEDURE EVALUATION
Anesthetic History   No history of anesthetic complications            Review of Systems / Medical History  Patient summary reviewed, nursing notes reviewed and pertinent labs reviewed    Pulmonary  Within defined limits                 Neuro/Psych   Within defined limits           Cardiovascular    Hypertension: well controlled              Exercise tolerance: >4 METS     GI/Hepatic/Renal  Within defined limits              Endo/Other  Within defined limits           Other Findings              Physical Exam    Airway  Mallampati: II  TM Distance: 4 - 6 cm  Neck ROM: normal range of motion   Mouth opening: Normal     Cardiovascular  Regular rate and rhythm,  S1 and S2 normal,  no murmur, click, rub, or gallop             Dental  No notable dental hx       Pulmonary  Breath sounds clear to auscultation               Abdominal  GI exam deferred       Other Findings            Anesthetic Plan    ASA: 2  Anesthesia type: spinal      Post-op pain plan if not by surgeon: intrathecal opiates      Anesthetic plan and risks discussed with: Patient
SR@ 67 bpm

## (undated) DEVICE — SHEET,DRAPE,40X58,STERILE: Brand: MEDLINE

## (undated) DEVICE — PACK PROCEDURE SURG C SECT DMC

## (undated) DEVICE — SUTURE MCRYL SZ 3-0 L27IN ABSRB UD L60MM KS STR REV CUT Y523H

## (undated) DEVICE — SOL IRR SOD CL 0.9% 1000ML BTL --

## (undated) DEVICE — MEDI-VAC NON-CONDUCTIVE SUCTION TUBING: Brand: CARDINAL HEALTH

## (undated) DEVICE — TOWEL SURG W16XL26IN BLU NONFENESTRATED DLX ST 2 PER PK

## (undated) DEVICE — DERMABOND SKIN ADH 0.7ML -- DERMABOND ADVANCED 12/BX

## (undated) DEVICE — SUTURE VCRL SZ 3-0 L27IN ABSRB UD L36MM CT-1 1/2 CIR J258H

## (undated) DEVICE — SPONGE LAP 18X18IN STRL -- 5/PK

## (undated) DEVICE — STERILE POLYISOPRENE POWDER-FREE SURGICAL GLOVES: Brand: PROTEXIS

## (undated) DEVICE — SUTURE VCRL SZ 0 L36IN ABSRB VLT L40MM CT 1/2 CIR J358H

## (undated) DEVICE — FLEX ADVANTAGE 1500CC: Brand: FLEX ADVANTAGE

## (undated) DEVICE — MEDI-VAC NON-CONDUCTIVE SUCTION TUBING 6MM X 6.1M (20 FT.) L: Brand: CARDINAL HEALTH

## (undated) DEVICE — KENDALL SCD EXPRESS SLEEVES, KNEE LENGTH, MEDIUM: Brand: KENDALL SCD

## (undated) DEVICE — 1.5L THIN WALL CAN: Brand: CRD

## (undated) DEVICE — CATH KT SUC CTRL VLV 10FR --

## (undated) DEVICE — Device

## (undated) DEVICE — SUTURE 0 L36IN ABSRB BRN CT L40MM 1/2 CIR TAPERPOINT SGL 914H

## (undated) DEVICE — INTENDED FOR TISSUE SEPARATION, AND OTHER PROCEDURES THAT REQUIRE A SHARP SURGICAL BLADE TO PUNCTURE OR CUT.: Brand: BARD-PARKER SAFETY BLADES SIZE 10, STERILE